# Patient Record
Sex: MALE | Race: WHITE | NOT HISPANIC OR LATINO | Employment: PART TIME | URBAN - METROPOLITAN AREA
[De-identification: names, ages, dates, MRNs, and addresses within clinical notes are randomized per-mention and may not be internally consistent; named-entity substitution may affect disease eponyms.]

---

## 2019-11-05 ENCOUNTER — OFFICE VISIT (OUTPATIENT)
Dept: NEUROLOGY | Facility: CLINIC | Age: 29
End: 2019-11-05
Payer: COMMERCIAL

## 2019-11-05 VITALS
HEIGHT: 73 IN | WEIGHT: 199.6 LBS | BODY MASS INDEX: 26.45 KG/M2 | HEART RATE: 69 BPM | SYSTOLIC BLOOD PRESSURE: 122 MMHG | DIASTOLIC BLOOD PRESSURE: 76 MMHG

## 2019-11-05 DIAGNOSIS — G40.909 NONINTRACTABLE EPILEPSY WITHOUT STATUS EPILEPTICUS, UNSPECIFIED EPILEPSY TYPE (HCC): Primary | ICD-10-CM

## 2019-11-05 PROCEDURE — 99205 OFFICE O/P NEW HI 60 MIN: CPT | Performed by: PSYCHIATRY & NEUROLOGY

## 2019-11-05 RX ORDER — LACOSAMIDE 200 MG/1
200 TABLET, FILM COATED ORAL 2 TIMES DAILY
Qty: 180 TABLET | Refills: 1 | Status: SHIPPED | OUTPATIENT
Start: 2019-11-05 | End: 2020-03-20 | Stop reason: SDUPTHER

## 2019-11-05 RX ORDER — LEVETIRACETAM 1000 MG/1
TABLET ORAL 2 TIMES DAILY
COMMUNITY
End: 2019-11-05 | Stop reason: SDUPTHER

## 2019-11-05 RX ORDER — LACOSAMIDE 200 MG/1
1 TABLET, FILM COATED ORAL 2 TIMES DAILY
Refills: 0 | COMMUNITY
Start: 2019-10-27 | End: 2019-11-05 | Stop reason: SDUPTHER

## 2019-11-05 RX ORDER — BIOTIN 5 MG
2 TABLET ORAL DAILY
COMMUNITY

## 2019-11-05 RX ORDER — LEVETIRACETAM 1000 MG/1
TABLET ORAL
Qty: 315 TABLET | Refills: 3 | Status: SHIPPED | OUTPATIENT
Start: 2019-11-05 | End: 2020-03-30 | Stop reason: SDUPTHER

## 2019-11-05 RX ORDER — MULTIVIT WITH MINERALS/LUTEIN
3000 TABLET ORAL DAILY
COMMUNITY

## 2019-11-05 NOTE — PATIENT INSTRUCTIONS
1  Continue levetiracetam 1500 mg AM / 2000 mg PM    2  Continue lacosamide 200 mg twice daily  3  Let us know if there are seizures  4  Return in about 6 months

## 2019-11-05 NOTE — PROGRESS NOTES
[place] [address] [facility]  Initial Consultation    Impression/Plan    Mr Idania Davenport is a 26 y o  male with mild developmental delay / static encephalopathy presenting regarding focal epilepsy manifest as focal aware/unaware seizures and secondary generalized tonic clonic seizures  His neurological exam is normal      We discussed the pathophysiology of epilepsy/seizure and seizure safety/precautions  We discussed factors that can lower seizure threshold and the side effects of antiepileptic medications  Seizures have been controlled on his current AED regimen  No changes today  Patient Instructions   1  Continue levetiracetam 1500 mg AM / 2000 mg PM    2  Continue lacosamide 200 mg twice daily  3  Let us know if there are seizures  4  Return in about 6 months  Diagnoses and all orders for this visit:    Nonintractable epilepsy without status epilepticus, unspecified epilepsy type ([facility] )  -     levETIRAcetam (KEPPRA) 1000 MG tablet; Take 1 5 tablets (1,500 mg total) by mouth every morning AND 2 tablets (2,000 mg total) daily at bedtime   -     VIMPAT 200 MG tablet; Take 1 tablet (200 mg total) by mouth 2 (two) times a day    Other orders  -     Ascorbic Acid (VITAMIN C) 1000 MG tablet; Take 3,000 mg by mouth daily  -     Cholecalciferol (VITAMIN D3 PO); Take 2,000 Units by mouth daily  -     Discontinue: VIMPAT 200 MG tablet; Take 1 tablet by mouth 2 (two) times a day  -     Discontinue: levETIRAcetam 1000 MG TB3D; Take by mouth TAKE 1 5 TABLETS IN THE AM AND 2 TABLETS IN THE PM  -     Krill Oil 1000 MG CAPS; Take 1 capsule by mouth 2 (two) times a day  -     Discontinue: levETIRAcetam (KEPPRA) 1000 MG tablet; Take by mouth 2 (two) times a day 1500 mg AM / 2000 mg PM        Neyda Marcano is a 26 y o  male presenting to the [place] [address] [facility] for evaluation of epilepsy  The first seizure was in 1/2005  Had seizure while sleeping on the B   Not started on AED  Next seizure was 6/2005 in [place]  Started phenytoin  No more seizures until a few years later when he had a seizure after the H1N1 flu shot  Eventually transitioned to levetiracetam because phenytoin levels were hard to manage  Seizures were then controlled for a number of years  Seizures recurred in 10/2018 (nocturnal)  Seizure prior had been about 5 years earlier  No change in levetiracetam 1500 mg AM / 2000 mg PM      Focal seizures occurred in 3/2019 over 4-5 days  Couldn't figure how to get out  of his seat to let someone pass  At restaurant he had trouble holding his burger for less than a minute  Then dropped ball when bowling and said right hand felt funny  There were 2 more at home, funny feeling in right hand and vacant look, with apparent retained awareness  Levetiracetam increased to 2000 mg twice daily  On the higher dose he seemed tired and not as focused (mixed up laundry), but these symptoms did seem to improve  Lower dose was discussed, but they did not have him decrease  6/9/2019  3 seizures over 1 5 days in setting of fever and didn't return back to baseline in between the first 2 seizures  Called EMS  [facility]  Had 3rd seizure in the ED  Vimpat added   Current AEDs:  Levetiracetam 1500 mg AM / 2000 mg PM  Vimpat 200 mg bid  Medication side effects: None  Medication adherence: Yes    Event/Seizure semiology:  1  GTC seizure  2  Focal seizure  R hand funny feeling, vacant look, apparent retained awareness  Special Features  Status epilepticus: no  Self Injury Seizures: unknown  Precipitating Factors: unknown    Epilepsy Risk Factors:  Full term, normal birth  Early intervention at about 18 months  Went to [facility]  Given diagnosis of mild/moderate MR  Walked and talked late  Talked at about 1 yo  2 years behind in school  Inclusion sometimes  Life skills program in middle and high school  Stopped school at 23  Works 3 days, 4 hours each   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  Obtained [employer]   Prior AEDs:  Phenytoin    Prior Evaluation:  Sedated MRI brain done in 5/2019  Study was normal    All EEGs have been normal      History Reviewed: The following were reviewed and updated as appropriate: allergies, current medications, past family history, past medical history, past social history, past surgical history and problem list     Psychiatric History:  None    Social History:   Driving: No  Lives Alone: unknown  Occupation: Works 3 days, 4 hours each  ROS:  No fever, chills, n/v  No vision changes  No chest pain, no sob  No respiratory problems  No walking problems  No new rash  Remainder of full 10 system review negative with exception of what is documented elsewhere in this note  Objective    /76 (BP Location: Left arm, Patient Position: Sitting, Cuff Size: Standard)   Pulse 69   Ht 6' 1" (1 854 m)   Wt 90 5 kg (199 lb 9 6 oz)   BMI 26 33 kg/m²      General Exam  General: well developed, no acute distress  HEENT: mucous membranes moist, anicteric sclera  Neck: supple, good ROM  Extremities: no clubbing, cyanosis or edema  Skin: no rash on visible skin  Neurological Exam  Mental Status: awake, alert, and fully oriented to person, place, time, and situation  Attention and memory intact  Fund of knowledge is appropriate for age and education  There is no neglect  Language: fluency, naming, comprehension, and repetition normal        Cranial Nerves: Pupils equal and reactive to light  Visual fields full to confrontation  Fundoscopic exam showed sharp discs and normal vasculature  Extraocular motions intact with full versions, normal pursuits and saccades  Facial strength full and symmetric  Facial sensation intact in V1-V3  Hearing intact to finger rub bilaterally  Tongue protrudes to midline  Palate elevates symmetrically  Speech clear without notable dysarthria   Shoulder shrug activation full and symmetric  Motor: Normal bulk and tone  No pronator drift  Strength is 5/5 proximally and distally in all 4 extremities  No involuntary movements  Sensory: Sensation intact to light touch distally in all extremities  Coordination: Normal finger-to-nose  Station and gait: Casual gait normal  Normal Romberg  Reflexes: Reflexes normal throughout and symmetric  Catherine Wright MD   [facility]  [facility] [facility]        Total time with patient today: 65 minutes  Greater than 50% of total time was spent with the patient and / or family counseling and / or coordination of care  A description of the counseling / coordination of care: discussed impression/plan in detail  See above

## 2020-03-20 ENCOUNTER — TELEPHONE (OUTPATIENT)
Dept: NEUROLOGY | Facility: CLINIC | Age: 30
End: 2020-03-20

## 2020-03-20 DIAGNOSIS — G40.909 NONINTRACTABLE EPILEPSY WITHOUT STATUS EPILEPTICUS, UNSPECIFIED EPILEPSY TYPE (HCC): ICD-10-CM

## 2020-03-20 RX ORDER — LACOSAMIDE 200 MG/1
TABLET, FILM COATED ORAL
Qty: 225 TABLET | Refills: 1 | Status: SHIPPED | OUTPATIENT
Start: 2020-03-20 | End: 2020-04-02 | Stop reason: SDUPTHER

## 2020-03-20 NOTE — TELEPHONE ENCOUNTER
Agree with evaluation at local ED  They should ask the ED to send serum levels of lacosamide and levetiracetam and do basic workup for cause of breakthrough seizures such as infection or electrolyte abnormality  Dose of lacosamide could be increased if there is no clear provocation identified  The ED physician can reach out to me if there are questions (clinical team can reach me via tiger text if ED calls)

## 2020-03-20 NOTE — TELEPHONE ENCOUNTER
pt's mom called and states that pt had a grand mal seizure this am   ambulance is there to take him to the hospital   she asked what she should do   she states that they live 70 miles from our office  i advised that he be evaluated in the nearest ER

## 2020-03-20 NOTE — TELEPHONE ENCOUNTER
Spoke with [facility] ED  Recommended increasing lacosamide to 200 mg AM / 300 mg PM  No provocation discovered  He is back to baseline now and will be discharged home  Lacosamide and levetiracetam levels were sent  New Rx sent to pharmacy on file

## 2020-03-20 NOTE — TELEPHONE ENCOUNTER
Mina GARNETT from [facility] ED called requesting to speak w/ Dr Brittni Taylor  Tiggary text message sent to Dr Brittni Taylor  Awaiting response  Call transferred to Dr Brittni Taylor                   [phone]

## 2020-03-30 DIAGNOSIS — G40.909 NONINTRACTABLE EPILEPSY WITHOUT STATUS EPILEPTICUS, UNSPECIFIED EPILEPSY TYPE (HCC): ICD-10-CM

## 2020-03-30 RX ORDER — LEVETIRACETAM 1000 MG/1
TABLET ORAL
Qty: 315 TABLET | Refills: 3 | Status: SHIPPED | OUTPATIENT
Start: 2020-03-30 | End: 2021-01-05 | Stop reason: SDUPTHER

## 2020-04-01 NOTE — TELEPHONE ENCOUNTER
Spoke with Val Brunner at [facility] and she is stating that no where in the patients chart does it state that you wanted these levels ordered so due to this we are unable to obtain patients blood levels without patients signed authorization  I explained to her the prior phone conversation but again due to nothing being documented on their end they cannot release the levels to us  A Dr Earle Austin ordered the levels

## 2020-04-01 NOTE — TELEPHONE ENCOUNTER
We need the results  Should fall under continuity of care exception, but if they are hindering care then consider having patient give [facility] verbal consent so that results can be sent to us

## 2020-04-02 DIAGNOSIS — G40.909 NONINTRACTABLE EPILEPSY WITHOUT STATUS EPILEPTICUS, UNSPECIFIED EPILEPSY TYPE (HCC): ICD-10-CM

## 2020-04-02 RX ORDER — LACOSAMIDE 200 MG/1
TABLET, FILM COATED ORAL
Qty: 225 TABLET | Refills: 1 | Status: SHIPPED | OUTPATIENT
Start: 2020-04-02 | End: 2020-04-15 | Stop reason: SDUPTHER

## 2020-04-03 NOTE — TELEPHONE ENCOUNTER
[address] records, a release of information form was e-mailed to patient yesterday  Will check back next week

## 2020-04-13 NOTE — TELEPHONE ENCOUNTER
LM with patients mother Rylie Ugarte asking if form was mailed back to [facility] for records to be released

## 2020-04-15 RX ORDER — LACOSAMIDE 100 MG/1
100 TABLET, FILM COATED ORAL
Qty: 90 TABLET | Refills: 1 | Status: SHIPPED | OUTPATIENT
Start: 2020-04-15 | End: 2020-04-15 | Stop reason: ALTCHOICE

## 2020-04-15 RX ORDER — LACOSAMIDE 200 MG/1
200 TABLET, FILM COATED ORAL EVERY 12 HOURS SCHEDULED
Qty: 180 TABLET | Refills: 1 | Status: SHIPPED | OUTPATIENT
Start: 2020-04-15 | End: 2020-04-15 | Stop reason: SDUPTHER

## 2020-04-15 RX ORDER — LACOSAMIDE 200 MG/1
TABLET, FILM COATED ORAL
Qty: 225 TABLET | Refills: 3 | Status: SHIPPED | OUTPATIENT
Start: 2020-04-15 | End: 2020-05-04 | Stop reason: SDUPTHER

## 2020-04-15 NOTE — TELEPHONE ENCOUNTER
Spoke with Yesenia Sarabia  She was never made aware of this and made me aware that of the release for Fartun Catarina did sign the form as well as print his name  She is going to be calling Mauro to follow up with them

## 2020-04-15 NOTE — TELEPHONE ENCOUNTER
Spoke with Taran Flores at [facility]  She notified me that the patients mother Honora Frankel) signed the form which needs to be signed by the patient so it was sent back to her  Will reach back out to [facility] to make sure she is aware of this

## 2020-04-15 NOTE — TELEPHONE ENCOUNTER
Spoke with patients mother, she stated she sent the form back on 4 2  She did receive my call 2 days ago and called Ed Downey explaining that we have yet to receive anything  Will call Ed Downey now

## 2020-05-04 ENCOUNTER — TELEPHONE (OUTPATIENT)
Dept: NEUROLOGY | Facility: CLINIC | Age: 30
End: 2020-05-04

## 2020-05-04 DIAGNOSIS — G40.909 NONINTRACTABLE EPILEPSY WITHOUT STATUS EPILEPTICUS, UNSPECIFIED EPILEPSY TYPE (HCC): Primary | ICD-10-CM

## 2020-05-04 RX ORDER — LACOSAMIDE 150 MG/1
300 TABLET ORAL EVERY 12 HOURS SCHEDULED
Qty: 120 TABLET | Refills: 5 | Status: SHIPPED | OUTPATIENT
Start: 2020-05-04 | End: 2020-05-26 | Stop reason: SDUPTHER

## 2020-05-21 ENCOUNTER — HOSPITAL ENCOUNTER (OUTPATIENT)
Dept: NEUROLOGY | Facility: HOSPITAL | Age: 30
Discharge: HOME | End: 2020-05-21
Attending: PSYCHIATRY & NEUROLOGY
Payer: COMMERCIAL

## 2020-05-21 DIAGNOSIS — G40.909 NONINTRACTABLE EPILEPSY WITHOUT STATUS EPILEPTICUS, UNSPECIFIED EPILEPSY TYPE (HCC): ICD-10-CM

## 2020-05-21 PROCEDURE — 95812 EEG 41-60 MINUTES: CPT | Performed by: PSYCHIATRY & NEUROLOGY

## 2020-05-21 PROCEDURE — 95819 EEG AWAKE AND ASLEEP: CPT

## 2020-05-26 ENCOUNTER — TELEMEDICINE (OUTPATIENT)
Dept: NEUROLOGY | Facility: CLINIC | Age: 30
End: 2020-05-26
Payer: COMMERCIAL

## 2020-05-26 ENCOUNTER — TELEPHONE (OUTPATIENT)
Dept: NEUROLOGY | Facility: CLINIC | Age: 30
End: 2020-05-26

## 2020-05-26 VITALS
SYSTOLIC BLOOD PRESSURE: 118 MMHG | WEIGHT: 195 LBS | BODY MASS INDEX: 25.84 KG/M2 | DIASTOLIC BLOOD PRESSURE: 75 MMHG | HEIGHT: 73 IN | HEART RATE: 67 BPM

## 2020-05-26 DIAGNOSIS — G40.909 NONINTRACTABLE EPILEPSY WITHOUT STATUS EPILEPTICUS, UNSPECIFIED EPILEPSY TYPE (HCC): Primary | ICD-10-CM

## 2020-05-26 PROCEDURE — 99213 OFFICE O/P EST LOW 20 MIN: CPT | Performed by: NURSE PRACTITIONER

## 2020-05-26 RX ORDER — LACOSAMIDE 150 MG/1
300 TABLET ORAL EVERY 12 HOURS SCHEDULED
Qty: 120 TABLET | Refills: 2 | Status: SHIPPED | OUTPATIENT
Start: 2020-05-26 | End: 2020-09-08

## 2020-06-29 ENCOUNTER — HOSPITAL ENCOUNTER (OUTPATIENT)
Dept: NEUROLOGY | Facility: CLINIC | Age: 30
Discharge: HOME | End: 2020-06-29

## 2020-06-29 DIAGNOSIS — G40.909 NONINTRACTABLE EPILEPSY WITHOUT STATUS EPILEPTICUS, UNSPECIFIED EPILEPSY TYPE (HCC): ICD-10-CM

## 2020-06-30 ENCOUNTER — HOSPITAL ENCOUNTER (OUTPATIENT)
Dept: NEUROLOGY | Facility: CLINIC | Age: 30
Discharge: HOME | End: 2020-06-30
Payer: COMMERCIAL

## 2020-06-30 DIAGNOSIS — G40.909 NONINTRACTABLE EPILEPSY WITHOUT STATUS EPILEPTICUS, UNSPECIFIED EPILEPSY TYPE (HCC): ICD-10-CM

## 2020-06-30 PROCEDURE — 95708 EEG WO VID EA 12-26HR UNMNTR: CPT

## 2020-06-30 PROCEDURE — 95719 EEG PHYS/QHP EA INCR W/O VID: CPT | Performed by: PSYCHIATRY & NEUROLOGY

## 2020-07-07 ENCOUNTER — TELEPHONE (OUTPATIENT)
Dept: NEUROLOGY | Facility: CLINIC | Age: 30
End: 2020-07-07

## 2020-07-07 NOTE — TELEPHONE ENCOUNTER
----- [facility] at 7/6/2020  8:28 AM EDT -----  Patient's EEG is similar to the prior  No changes in patient's medications at this time

## 2020-07-07 NOTE — TELEPHONE ENCOUNTER
Spoke to pt mom and reviewed results  She does report that he had a focal seizure that occurred while he was eating on sunday  He was upset talking about friends during dinner then stared off for approximately 5 seconds (had a look of fear) and then was confused/out of it for another 5-10 seconds mom reports  Mom said she knew we would be calling with results, so that is why she did not call us to report the seizure  She states she would prefer not to increase his medication if you are ok with him having a focal sz "here and there"  Pt mom had other questions in regards to reading the eeg and if there was any particular time of day that she should be more concerned about monitoring him for sz activity  She asked if someone could call her back to discuss the EEG in more detail

## 2020-07-09 NOTE — TELEPHONE ENCOUNTER
The EEG shows that there is abnormal activity coming from the right side of his brain which is likely where his seizures are coming from  He did not have any seizures recorded on his EEG  These findings are the same as his last EEG done in May which confirms his need to be on medication to control his seizures  His seizures seemed to be controlled at his last visit after his vimpat was increased  If he begins to have the focal seizures more frequently she should let us know  She should also let us know if he has a bigger seizure (he does have generalized tonic clonic seizures as well) because we would then definitely want to increase his medications       Thank you

## 2020-09-08 DIAGNOSIS — G40.909 NONINTRACTABLE EPILEPSY WITHOUT STATUS EPILEPTICUS, UNSPECIFIED EPILEPSY TYPE (HCC): ICD-10-CM

## 2020-09-08 LAB — HBA1C MFR BLD HPLC: 5.3 %

## 2020-09-08 RX ORDER — LACOSAMIDE 150 MG/1
TABLET, FILM COATED ORAL
Qty: 120 TABLET | Refills: 2 | Status: SHIPPED | OUTPATIENT
Start: 2020-09-08 | End: 2020-12-09 | Stop reason: SDUPTHER

## 2020-10-02 ENCOUNTER — TELEPHONE (OUTPATIENT)
Dept: NEUROLOGY | Facility: CLINIC | Age: 30
End: 2020-10-02

## 2020-10-05 ENCOUNTER — TELEMEDICINE (OUTPATIENT)
Dept: NEUROLOGY | Facility: CLINIC | Age: 30
End: 2020-10-05
Payer: COMMERCIAL

## 2020-10-05 VITALS — WEIGHT: 202 LBS | HEIGHT: 73 IN | BODY MASS INDEX: 26.77 KG/M2

## 2020-10-05 DIAGNOSIS — G40.909 NONINTRACTABLE EPILEPSY WITHOUT STATUS EPILEPTICUS, UNSPECIFIED EPILEPSY TYPE (HCC): Primary | ICD-10-CM

## 2020-10-05 DIAGNOSIS — F70 MILD INTELLECTUAL DISABILITIES: ICD-10-CM

## 2020-10-05 PROCEDURE — 99214 OFFICE O/P EST MOD 30 MIN: CPT | Performed by: PSYCHIATRY & NEUROLOGY

## 2020-10-07 PROBLEM — F70 MILD INTELLECTUAL DISABILITIES: Status: ACTIVE | Noted: 2020-10-07

## 2020-10-08 ENCOUNTER — TELEPHONE (OUTPATIENT)
Dept: NEUROLOGY | Facility: CLINIC | Age: 30
End: 2020-10-08

## 2020-10-08 DIAGNOSIS — G40.909 NONINTRACTABLE EPILEPSY WITHOUT STATUS EPILEPTICUS, UNSPECIFIED EPILEPSY TYPE (HCC): Primary | ICD-10-CM

## 2020-12-09 DIAGNOSIS — G40.909 NONINTRACTABLE EPILEPSY WITHOUT STATUS EPILEPTICUS, UNSPECIFIED EPILEPSY TYPE (HCC): ICD-10-CM

## 2020-12-10 RX ORDER — LACOSAMIDE 150 MG/1
TABLET ORAL
Qty: 120 TABLET | Refills: 2 | Status: SHIPPED | OUTPATIENT
Start: 2020-12-10 | End: 2021-01-05 | Stop reason: SDUPTHER

## 2021-01-05 ENCOUNTER — TELEPHONE (OUTPATIENT)
Dept: NEUROLOGY | Facility: CLINIC | Age: 31
End: 2021-01-05

## 2021-01-05 ENCOUNTER — TELEMEDICINE (OUTPATIENT)
Dept: NEUROLOGY | Facility: CLINIC | Age: 31
End: 2021-01-05
Payer: COMMERCIAL

## 2021-01-05 DIAGNOSIS — F70 MILD INTELLECTUAL DISABILITIES: ICD-10-CM

## 2021-01-05 DIAGNOSIS — G40.909 NONINTRACTABLE EPILEPSY WITHOUT STATUS EPILEPTICUS, UNSPECIFIED EPILEPSY TYPE (HCC): Primary | ICD-10-CM

## 2021-01-05 PROCEDURE — 99214 OFFICE O/P EST MOD 30 MIN: CPT | Performed by: PSYCHIATRY & NEUROLOGY

## 2021-01-05 RX ORDER — LACOSAMIDE 150 MG/1
TABLET ORAL
Qty: 120 TABLET | Refills: 5 | Status: SHIPPED | OUTPATIENT
Start: 2021-01-05 | End: 2021-05-05 | Stop reason: SDUPTHER

## 2021-01-05 RX ORDER — LEVETIRACETAM 1000 MG/1
TABLET ORAL
Qty: 315 TABLET | Refills: 3 | Status: SHIPPED | OUTPATIENT
Start: 2021-01-05 | End: 2021-10-20 | Stop reason: SDUPTHER

## 2021-01-05 NOTE — PROGRESS NOTES
[place] [address] [address] [address] [place] [address]  Follow Up Visit    Impression/Plan    Mr Tobi Leach is a 31 y o  male with mild developmental delay / static encephalopathy presenting regarding focal epilepsy manifest as focal aware/unaware seizures and secondary generalized tonic clonic seizures  His neurological exam is normal      His events remain uncontrolled despite high doses of 2 AEDs  Stress seems to trigger nearly all of his recent events  His recent AEEG was not conclusive and revealed rhythmic activities of uncertain significance  Additional data is required in the form of EMU admission to characterize events on VEEG in order to guide the appropriate therapy (antiepileptic medication versus treatment of nonepileptic events)  There may be logistical problems with doing the admission as currently scheduled on January 25th  His mother is caring for his father who recently had a severe COVID infection  She needs to coordinate his care and get him to dialysis  In the next week she will determine if January admission is practical or if we need to reschedule for few months out  They will give us call if seizures worsen or if there are other questions/concerns  Patient Instructions   1  Continue current seizure medications unchanged  2  Let us know if there are seizures or problems with your medication  3  Consider rescheduling EMU admission if necessary  4  Return in 4 months to see Candace Cordero  Diagnoses and all orders for this visit:    Nonintractable epilepsy without status epilepticus, unspecified epilepsy type ([address] )    Mild intellectual disabilities        Subjective    Ochoa Knowles is returning to the [place] [address] [facility] for follow up  The patient and his mother were present for this video visit  Interval Events:   Seizures since last visit: yes, unchanged  Hospitalizations: no    A few events per month  Most recent was 1/2/2020   Unusually the most recent event occurred without him being agitated  Didn't look scared prior  Mother walked in and found him staring  He would count with mom at times, but then stop and stare  When then counted to 100 he would go in and out  There was rocking back and forth in his chair and apparent valsalva (like he was trying to go to the bathroom  When they got to 110 he seemed to be back at his baseline  Tested positive for COVID in early December  His father ended up with severe COVID symptoms and was hospitalized for multiple weeks and is now on dialysis  Current AEDs:  Levetiracetam 1500 mg AM / 2000 mg PM  Lacosamide 300 mg bid  Medication side effects: None  Medication adherence: Yes     Event/Seizure semiology:  · GTC seizure  · Focal seizure  R hand funny feeling, vacant look, apparent retained awareness       Special Features  Status epilepticus: no  Self Injury Seizures: unknown  Precipitating Factors: unknown     Epilepsy Risk Factors:  Full term, normal birth  Early intervention at about 18 months  Went to [facility]  Given diagnosis of mild/moderate MR  Walked and talked late  Talked at about 1 yo  2 years behind in school  Inclusion sometimes  Life skills program in middle and high school  Stopped school at 23  Works 3 days, 4 hours each  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  Obtained [employer]       Prior AEDs:  Phenytoin     Prior Evaluation:  Sedated MRI brain done in 5/2019  Study was normal    All EEGs have been normal       05/21/2020 EEG awake and asleep (Dr Darby Astudillo): Intermittent brief bursts of diffuse, right hemisphere dominant, 3-4 cps slowing as well as similar slowing isolated to the right hemisphere and maximal over the temporal region suggest underlying neuronal dysfunction with possible focal neuronal dysfunction in the right temporal region   Some of the bursts contain poorly formed, poorly localized, sharp components that are of uncertain significance       24 hour AEEG 6/30/2020:  Sporadic right temporal delta activities suggests an underlying area of neuronal dysfunction  The frequent runs of diffuse rhythmic theta activities that were maximal in the right temporal region are of unclear significance  These were typically brief and without clear evolution  Some features (diffuse distribution, abrupt start/stop, and on one occasion, increase in frequency over course of burst) would be suggestive of SREDA, however, patients age and short duration of this activity would be atypical for this variant  Overall, these are favored to be a benign variant, but the possibility of brief ictal rhythmic discharges cannot fully be excluded  A run of bilateral frontal delta activities is favored to represent artifact          History Reviewed: The following were reviewed and updated as appropriate: allergies, current medications, past family history, past medical history, past social history, past surgical history and problem list     Psychiatric History:  None     Social History:   Driving: No  Lives Alone: unknown  Occupation: Works 3 days, 4 hours each      ROS:  Review of Systems   Constitutional: Negative  Negative for appetite change and fever  HENT: Negative  Negative for hearing loss, tinnitus, trouble swallowing and voice change  Eyes: Negative  Negative for photophobia and pain  Respiratory: Negative  Negative for shortness of breath  Cardiovascular: Negative  Negative for palpitations  Gastrointestinal: Negative  Negative for nausea and vomiting  Endocrine: Negative  Negative for cold intolerance  Genitourinary: Negative  Negative for dysuria, frequency and urgency  Musculoskeletal: Negative  Negative for myalgias and neck pain  Skin: Negative  Negative for rash  Neurological: Positive for seizures  Negative for dizziness, tremors, syncope, facial asymmetry, speech difficulty, weakness, light-headedness, numbness and headaches     Hematological: Negative  Does not bruise/bleed easily  Psychiatric/Behavioral: Negative  Negative for confusion, hallucinations and sleep disturbance  ROS reviewed and updated as appropriate  Objective    There were no vitals taken for this visit  General Exam  No acute distress  Neurologic Exam  Mental Status:  Alert and oriented  Language: normal fluency and comprehension  Cranial Nerves: Face symmetric  No dysarthria

## 2021-01-05 NOTE — PROGRESS NOTES
Virtual Regular Visit      Assessment/Plan:    Problem List Items Addressed This Visit     None               Reason for visit is   Chief Complaint   Patient presents with    Virtual Regular Visit        Encounter provider Hunter Newell MD    Provider located at [facility] [id number]      Recent Visits  No visits were found meeting these conditions  Showing recent visits within past 7 days and meeting all other requirements     Today's Visits  Date Type Provider Dept   01/05/21 Telephone [address] [facility] today's visits and meeting all other requirements     Future Appointments  No visits were found meeting these conditions  Showing future appointments within next 150 days and meeting all other requirements        The patient was identified by name and date of birth  River Pitts was informed that this is a telemedicine visit and that the visit is being conducted through [employer] and patient was informed that this is a secure, HIPAA-compliant platform  He agrees to proceed     My office door was closed  No one else was in the room  He acknowledged consent and understanding of privacy and security of the video platform  The patient has agreed to participate and understands they can discontinue the visit at any time  Patient is aware this is a billable service  [address] Neurology [address] [facility]  Follow Up Visit    Impression/Plan    Mr Janeth Estrada is a 31 y o  male with mild developmental delay / static encephalopathy presenting regarding focal epilepsy manifest as focal aware/unaware seizures and secondary generalized tonic clonic seizures  His neurological exam is normal      His events remain uncontrolled despite high doses of 2 AEDs  Stress seems to trigger nearly all of his recent events   His recent AEEG was not conclusive and revealed rhythmic activities of uncertain significance  Additional data is required in the form of EMU admission to characterize events on VEEG in order to guide the appropriate therapy (antiepileptic medication versus treatment of nonepileptic events)  There may be logistical problems with doing the admission as currently scheduled on January 25th  His mother is caring for his father who recently had a severe COVID infection  She needs to coordinate his care and get him to dialysis  In the next week she will determine if January admission is practical or if we need to reschedule for few months out  They will give us call if seizures worsen or if there are other questions/concerns  Patient Instructions   1  Continue current seizure medications unchanged  2  Let us know if there are seizures or problems with your medication  3  Consider rescheduling EMU admission if necessary  4  Return in 4 months to see Rene Strong  Diagnoses and all orders for this visit:    Nonintractable epilepsy without status epilepticus, unspecified epilepsy type ([facility] )    Mild intellectual disabilities        Subjective    Oneal Rausch is returning to the [place] [address] [facility] for follow up  The patient and his mother were present for this video visit  Interval Events:   Seizures since last visit: yes, unchanged  Hospitalizations: no    A few events per month  Most recent was 1/2/2020  Unusually the most recent event occurred without him being agitated  Didn't look scared prior  Mother walked in and found him staring  He would count with mom at times, but then stop and stare  When then counted to 100 he would go in and out  There was rocking back and forth in his chair and apparent valsalva (like he was trying to go to the bathroom  When they got to 110 he seemed to be back at his baseline  Tested positive for COVID in early December    His father ended up with severe COVID symptoms and was hospitalized for multiple weeks and is now on dialysis  Current AEDs:  Levetiracetam 1500 mg AM / 2000 mg PM  Lacosamide 300 mg bid  Medication side effects: None  Medication adherence: Yes     Event/Seizure semiology:  · GTC seizure  · Focal seizure  R hand funny feeling, vacant look, apparent retained awareness       Special Features  Status epilepticus: no  Self Injury Seizures: unknown  Precipitating Factors: unknown     Epilepsy Risk Factors:  Full term, normal birth  Early intervention at about 18 months  Went to [facility]  Given diagnosis of mild/moderate MR  Walked and talked late  Talked at about 1 yo  2 years behind in school  Inclusion sometimes  Life skills program in middle and high school  Stopped school at 23  Works 3 days, 4 hours each  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  Obtained [employer]        Prior AEDs:  Phenytoin     Prior Evaluation:  Sedated MRI brain done in 5/2019  Study was normal    All EEGs have been normal       05/21/2020 EEG awake and asleep (Dr Sumit Sena): Intermittent brief bursts of diffuse, right hemisphere dominant, 3-4 cps slowing as well as similar slowing isolated to the right hemisphere and maximal over the temporal region suggest underlying neuronal dysfunction with possible focal neuronal dysfunction in the right temporal region  Some of the bursts contain poorly formed, poorly localized, sharp components that are of uncertain significance       24 hour AEEG 6/30/2020:  Sporadic right temporal delta activities suggests an underlying area of neuronal dysfunction  The frequent runs of diffuse rhythmic theta activities that were maximal in the right temporal region are of unclear significance  These were typically brief and without clear evolution   Some features (diffuse distribution, abrupt start/stop, and on one occasion, increase in frequency over course of burst) would be suggestive of SREDA, however, patients age and short duration of this activity would be atypical for this variant  Overall, these are favored to be a benign variant, but the possibility of brief ictal rhythmic discharges cannot fully be excluded  A run of bilateral frontal delta activities is favored to represent artifact          History Reviewed: The following were reviewed and updated as appropriate: allergies, current medications, past family history, past medical history, past social history, past surgical history and problem list     Psychiatric History:  None     Social History:   Driving: No  Lives Alone: unknown  Occupation: Works 3 days, 4 hours each      ROS:  Review of Systems   Constitutional: Negative  Negative for appetite change and fever  HENT: Negative  Negative for hearing loss, tinnitus, trouble swallowing and voice change  Eyes: Negative  Negative for photophobia and pain  Respiratory: Negative  Negative for shortness of breath  Cardiovascular: Negative  Negative for palpitations  Gastrointestinal: Negative  Negative for nausea and vomiting  Endocrine: Negative  Negative for cold intolerance  Genitourinary: Negative  Negative for dysuria, frequency and urgency  Musculoskeletal: Negative  Negative for myalgias and neck pain  Skin: Negative  Negative for rash  Neurological: Positive for seizures  Negative for dizziness, tremors, syncope, facial asymmetry, speech difficulty, weakness, light-headedness, numbness and headaches  Hematological: Negative  Does not bruise/bleed easily  Psychiatric/Behavioral: Negative  Negative for confusion, hallucinations and sleep disturbance  ROS reviewed and updated as appropriate  Objective    There were no vitals taken for this visit  General Exam  No acute distress  Neurologic Exam  Mental Status:  Alert and oriented  Language: normal fluency and comprehension  Cranial Nerves: Face symmetric  No dysarthria              I spent 15 minutes directly with the patient during this visit      VIRTUAL VISIT DISCLAIMER    Rochelle Duke acknowledges that he has consented to an online visit or consultation  He understands that the online visit is based solely on information provided by him, and that, in the absence of a face-to-face physical evaluation by the physician, the diagnosis he receives is both limited and provisional in terms of accuracy and completeness  This is not intended to replace a full medical face-to-face evaluation by the physician  Rochelle Duke understands and accepts these terms

## 2021-01-05 NOTE — PATIENT INSTRUCTIONS
1  Continue current seizure medications unchanged  2  Let us know if there are seizures or problems with your medication  3  Consider rescheduling EMU admission if necessary  4  Return in 4 months to see Dennis Ferrera

## 2021-01-11 ENCOUNTER — TELEPHONE (OUTPATIENT)
Dept: NEUROLOGY | Facility: CLINIC | Age: 31
End: 2021-01-11

## 2021-01-11 NOTE — TELEPHONE ENCOUNTER
Received fax from [facility] for patient    Patient needs PA for vimpat    PA initiated on CMM    KEY [id number]    Awaiting determination

## 2021-01-13 ENCOUNTER — TELEPHONE (OUTPATIENT)
Dept: NEUROLOGY | Facility: CLINIC | Age: 31
End: 2021-01-13

## 2021-01-13 DIAGNOSIS — G40.909 NONINTRACTABLE EPILEPSY WITHOUT STATUS EPILEPTICUS, UNSPECIFIED EPILEPSY TYPE (HCC): Primary | ICD-10-CM

## 2021-01-13 NOTE — TELEPHONE ENCOUNTER
pt's mom called to cancel EMU for 1/25  dr Wade Moreno was aware of the potential need to reschedule EMU at recent appt  She would like to schedule when dr Wade Moreno is in the EMU  I spoke to Jo Meng as EMU coverage was changed  She states that Dr Wade Moreno is in EMU 3/1  and 3/15  Pt's mom made aware  she would like to rescedule for 3/15 at 8am  EMU scheduled  Added to calendar  ADT order entered  1/25 EMU admission deleted from EMU calendar  PAC's made aware of cancellation    Admission updated by [facility]

## 2021-03-01 NOTE — TELEPHONE ENCOUNTER
Patient's mother stated she cannot come with patient to the EMU admit, and patient's father cannot either  She requested the EMU admit be cancelled for now and she will call back  Removed admit from the calendar  PACS made aware

## 2021-03-30 DIAGNOSIS — Z23 ENCOUNTER FOR IMMUNIZATION: ICD-10-CM

## 2021-04-29 ENCOUNTER — TELEPHONE (OUTPATIENT)
Dept: NEUROLOGY | Facility: CLINIC | Age: 31
End: 2021-04-29

## 2021-04-29 NOTE — TELEPHONE ENCOUNTER
Patient's mother calling to see if you would be agreeable to switching his upcoming appointment to a virtual  Said that she just had heart surgery and is starting cardiac rehab and will not be able to drive the patient  Please advise    [phone]: Ok to leave a detailed message

## 2021-05-03 ENCOUNTER — TELEPHONE (OUTPATIENT)
Dept: NEUROLOGY | Facility: CLINIC | Age: 31
End: 2021-05-03

## 2021-05-03 NOTE — TELEPHONE ENCOUNTER
Per Dr Robin Jones ok to change to [facility] virtual video visit  Obtained verbal consent for financial policy and insurance authorization form for virtual video visit  Confirmed registration info

## 2021-05-05 ENCOUNTER — TELEMEDICINE (OUTPATIENT)
Dept: NEUROLOGY | Facility: CLINIC | Age: 31
End: 2021-05-05
Payer: COMMERCIAL

## 2021-05-05 DIAGNOSIS — G40.009 PARTIAL IDIOPATHIC EPILEPSY WITH SEIZURES OF LOCALIZED ONSET, NOT INTRACTABLE, WITHOUT STATUS EPILEPTICUS (HCC): Primary | ICD-10-CM

## 2021-05-05 DIAGNOSIS — F70 MILD INTELLECTUAL DISABILITIES: ICD-10-CM

## 2021-05-05 PROCEDURE — 99214 OFFICE O/P EST MOD 30 MIN: CPT | Performed by: PSYCHIATRY & NEUROLOGY

## 2021-05-05 RX ORDER — LACOSAMIDE 150 MG/1
TABLET ORAL
Qty: 120 TABLET | Refills: 5 | Status: SHIPPED | OUTPATIENT
Start: 2021-05-05 | End: 2021-10-20 | Stop reason: SDUPTHER

## 2021-05-05 NOTE — PROGRESS NOTES
Virtual Regular Visit      Assessment/Plan:    Problem List Items Addressed This Visit        Nervous and Auditory    Partial idiopathic epilepsy with seizures of localized onset, not intractable, without status epilepticus ([address] ) - Primary    Relevant Medications    lacosamide (Vimpat) 150 mg tablet       Other    Mild intellectual disabilities               Reason for visit is   Chief Complaint   Patient presents with    Virtual Regular Visit        Encounter provider Luis Jackson MD    Provider located at [address]  [address] [address]      Recent Visits  Date Type Provider Dept   [facility]   05/03/21 Telephone [address] [facility] recent visits within past 7 days and meeting all other requirements     Future Appointments  No visits were found meeting these conditions  Showing future appointments within next 150 days and meeting all other requirements        The patient was identified by name and date of birth  Shantell Florez was informed that this is a telemedicine visit and that the visit is being conducted through [address] [place] [address] [employer] and patient was informed that this is a secure, HIPAA-compliant platform  He agrees to proceed     My office door was closed  No one else was in the room  He acknowledged consent and understanding of privacy and security of the video platform  The patient has agreed to participate and understands they can discontinue the visit at any time  Patient is aware this is a billable service       [address] Neurology [address] [facility]  Follow Up Visit    Impression/Plan    Mr Olamide Patiño is a 31 y o  male with mild developmental delay / static encephalopathy presenting regarding focal epilepsy manifest as focal aware/impaired aware seizures and secondary generalized tonic clonic seizures  His neurological exam is normal      His events remain uncontrolled despite high doses of 2 AEDs  Stress seems to trigger many of his recent events, but not all  His 2020 AEEG was not conclusive and revealed rhythmic activities of uncertain significance  Additional data is required in the form of EMU admission to characterize events on VEEG in order to guide the appropriate therapy (antiepileptic medication versus treatment of nonepileptic events)  They will give us call if seizures worsen or if there are other questions/concerns  Patient Instructions   1  Schedule EMU admission  2  Return in about 3 months  Diagnoses and all orders for this visit:    Partial idiopathic epilepsy with seizures of localized onset, not intractable, without status epilepticus ([facility] )  -     lacosamide (Vimpat) 150 mg tablet; Take two tablets (300 mg) by mouth every 12 hours    Mild intellectual disabilities        Subjective    Edy Kelly is returning to the [place] [address] [facility] for follow up  Interval Events:   Seizures since last visit: events essentially unchanged, occur every few weeks  No convulsion  Hospitalizations: no    Still at home, not back to work program  Mother had cardiac bypass surgery about 2 months ago  Father recovering from illness and requires HD for ESRD  Continues to have 15-20 second events  Can be every few weeks, sometimes might occur for a few days in a row  Last event was about 2 weeks ago  Usually sighs at the beginning and may stand up and then look of fear  They start to count and then he joins the counting between 10 and 20  Doesn't know that they happen  Says "I'm fine, I'm fine" after the events  After the events his hands are cold  Not a consistent trigger, but does seem to be when he gets frustrated or obsessed with something  [place] like Cynthiaparmjit Dalton caused an unpleasant feeling and he stopped, but no seizure occurred       Current AEDs:  Lacosamide 300 mg bid   Levetiracetam 1500 mg AM / 2000 mg PM  Medication side effects: None  Medication adherence: Yes    Event/Seizure semiology:  · GTC seizure  · Focal seizure  R hand funny feeling, vacant look, apparent retained awareness, but does not fully interact for a time  Usually sighs at the beginning and may stand up and then look of fear  They start to count and then he joins the counting between 10 and 20  Doesn't know that they happen  Says "I'm fine, I'm fine" after the events  After the events his hands are cold      Special Features  Status epilepticus: no  Self Injury Seizures: unknown  Precipitating Factors: unknown     Epilepsy Risk Factors:  Full term, normal birth  Early intervention at about 18 months  Went to [facility]  Given diagnosis of mild/moderate MR  Walked and talked late  Talked at about 1 yo  2 years behind in school  Inclusion sometimes  Life skills program in middle and high school  Stopped school at 23  Works 3 days, 4 hours each  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  Obtained [employer]        Prior AEDs:  Phenytoin     Prior Evaluation:  Sedated MRI brain done in 5/2019  Study was normal    All EEGs have been normal       05/21/2020 EEG awake and asleep (Dr Lilly Page): Intermittent brief bursts of diffuse, right hemisphere dominant, 3-4 cps slowing as well as similar slowing isolated to the right hemisphere and maximal over the temporal region suggest underlying neuronal dysfunction with possible focal neuronal dysfunction in the right temporal region  Some of the bursts contain poorly formed, poorly localized, sharp components that are of uncertain significance       24 hour AEEG 6/30/2020:  Sporadic right temporal delta activities suggests an underlying area of neuronal dysfunction  The frequent runs of diffuse rhythmic theta activities that were maximal in the right temporal region are of unclear significance  These were typically brief and without clear evolution   Some features (diffuse distribution, abrupt start/stop, and on one occasion, increase in frequency over course of burst) would be suggestive of SREDA, however, patients age and short duration of this activity would be atypical for this variant  Overall, these are favored to be a benign variant, but the possibility of brief ictal rhythmic discharges cannot fully be excluded  A run of bilateral frontal delta activities is favored to represent artifact          History Reviewed: The following were reviewed and updated as appropriate: allergies, current medications, past medical history, past social history, and problem list     Psychiatric History:  None     Social History:   Driving: No  Lives Alone:no  Occupation: Works 3 days, 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)    Objective    There were no vitals taken for this visit  General Exam  No acute distress  Neurologic Exam  Mental Status:  Alert and oriented x 3  Language: normal fluency and comprehension  Cranial Nerves: Face symmetric  No dysarthria  Motor:  Moves arms symmetrically without evidence of weakness or ataxia  I spent 15 minutes directly with the patient during this visit      VIRTUAL VISIT DISCLAIMER    Cristofer Tiwari acknowledges that he has consented to an online visit or consultation  He understands that the online visit is based solely on information provided by him, and that, in the absence of a face-to-face physical evaluation by the physician, the diagnosis he receives is both limited and provisional in terms of accuracy and completeness  This is not intended to replace a full medical face-to-face evaluation by the physician  Cristofer Tiwari understands and accepts these terms

## 2021-05-07 ENCOUNTER — TELEPHONE (OUTPATIENT)
Dept: NEUROLOGY | Facility: CLINIC | Age: 31
End: 2021-05-07

## 2021-05-07 DIAGNOSIS — G40.009 PARTIAL IDIOPATHIC EPILEPSY WITH SEIZURES OF LOCALIZED ONSET, NOT INTRACTABLE, WITHOUT STATUS EPILEPTICUS (HCC): Primary | ICD-10-CM

## 2021-05-07 NOTE — TELEPHONE ENCOUNTER
----- Message from Barbara Carpenter MD sent at 5/7/2021  7:31 AM EDT -----  Regarding: EMU admission  Please schedule EMU admission to characterize events that may or may not be seizure and determine definite diagnosis in the setting of indefinite findings on prior EEG in a patient with intractable epilepsy  Please see if we can get admission approved without requiring another routine EEG which will serve no purpose - the EMU admission will be required independent of any interictal EEG finding and his events are too infrequent to capture on a routine EEG       Last EEGs:   REEG 5/21/2020AEEG 6/30/2020

## 2021-05-07 NOTE — TELEPHONE ENCOUNTER
Mom returned call  Agreeable to rescheduling EMU admission  Added to EMU calendar for 6/14/2021 [facility] entered  EMU information mailed to patient  Thomas aCceres, EMU admission for 6/14/2021 9am     Dr Ashraf - scheduled patient for EMU  Mom requested she be able to stay with patient for admission  States when previously scheduled she was given permission

## 2021-05-10 NOTE — TELEPHONE ENCOUNTER
Attempted to start EMU authorization [phone] the insurance company system is down     Recording stated to try again later

## 2021-05-13 NOTE — TELEPHONE ENCOUNTER
Fax received from insurance company this morning  Completed form, faxed back attached all clinicals   [phone]

## 2021-05-13 NOTE — TELEPHONE ENCOUNTER
Received call from [address] D:  She advised that authorization is not required for the procedure [id number]   Authorization is required for the elective hospital stay and advised that the hospital end will need to start that authorization with the patient's facesheet and other information and it will need to be faxed to [phone]

## 2021-05-14 NOTE — TELEPHONE ENCOUNTER
Received call from [address] D:  She advised that authorization is not required for the procedure [id number]  Authorization is required for the elective hospital stay and advised that the hospital end will need to start that authorization with the patient's facesheet and other information and it will need to be faxed to [phone] do not have to do anything  The hospital is responsible to obtain the auth at this point  l

## 2021-05-26 NOTE — TELEPHONE ENCOUNTER
Re checked the EMU authorization per Mickey Mcbride and José Miguel  Spoke to Bobbi Loco  Who spoke directly to Vidhya TREADWELL  In the Care [employer] dept  She verified the information that Moreno TREADWELL   05/14/2021   "She advised that authorization is not required for the procedure [id number]   Authorization is required for the elective hospital stay and advised that the hospital end will need to start that authorization with the patient's facesheet and other information and it will need to be faxed to [phone]"    Call ref for call with Marimar BERMEO   [phone]

## 2021-06-11 NOTE — TELEPHONE ENCOUNTER
Spoke to mom  Informed of previous  Confirmed date and time of admission on Monday at 9am  No further questions at this time

## 2021-06-14 ENCOUNTER — HOSPITAL ENCOUNTER (INPATIENT)
Facility: HOSPITAL | Age: 31
LOS: 2 days | Discharge: HOME | DRG: 101 | End: 2021-06-16
Attending: PSYCHIATRY & NEUROLOGY | Admitting: PSYCHIATRY & NEUROLOGY
Payer: COMMERCIAL

## 2021-06-14 ENCOUNTER — APPOINTMENT (INPATIENT)
Dept: NEUROLOGY | Facility: CLINIC | Age: 31
DRG: 101 | End: 2021-06-14
Payer: COMMERCIAL

## 2021-06-14 DIAGNOSIS — G40.009 LOCALIZATION-RELATED (FOCAL) (PARTIAL) IDIOPATHIC EPILEPSY AND EPILEPTIC SYNDROMES WITH SEIZURES OF LOCALIZED ONSET, NOT INTRACTABLE, WITHOUT STATUS EPILEPTICUS (HCC): Primary | ICD-10-CM

## 2021-06-14 LAB
ALBUMIN SERPL BCP-MCNC: 4.7 G/DL (ref 3.5–5)
ALP SERPL-CCNC: 81 U/L (ref 46–116)
ALT SERPL W P-5'-P-CCNC: 33 U/L (ref 12–78)
ANION GAP SERPL CALCULATED.3IONS-SCNC: 3 MMOL/L (ref 4–13)
AST SERPL W P-5'-P-CCNC: 15 U/L (ref 5–45)
ATRIAL RATE: 62 BPM
BASOPHILS # BLD AUTO: 0.04 THOUSANDS/ΜL (ref 0–0.1)
BASOPHILS NFR BLD AUTO: 1 % (ref 0–1)
BILIRUB SERPL-MCNC: 0.29 MG/DL (ref 0.2–1)
BUN SERPL-MCNC: 11 MG/DL (ref 5–25)
CALCIUM SERPL-MCNC: 9.6 MG/DL (ref 8.3–10.1)
CHLORIDE SERPL-SCNC: 109 MMOL/L (ref 100–108)
CO2 SERPL-SCNC: 28 MMOL/L (ref 21–32)
CREAT SERPL-MCNC: 0.9 MG/DL (ref 0.6–1.3)
EOSINOPHIL # BLD AUTO: 0.1 THOUSAND/ΜL (ref 0–0.61)
EOSINOPHIL NFR BLD AUTO: 1 % (ref 0–6)
ERYTHROCYTE [DISTWIDTH] IN BLOOD BY AUTOMATED COUNT: 12.7 % (ref 11.6–15.1)
GFR SERPL CREATININE-BSD FRML MDRD: 113 ML/MIN/1.73SQ M
GLUCOSE SERPL-MCNC: 81 MG/DL (ref 65–140)
HCT VFR BLD AUTO: 43.2 % (ref 36.5–49.3)
HGB BLD-MCNC: 14.1 G/DL (ref 12–17)
IMM GRANULOCYTES # BLD AUTO: 0.02 THOUSAND/UL (ref 0–0.2)
IMM GRANULOCYTES NFR BLD AUTO: 0 % (ref 0–2)
LYMPHOCYTES # BLD AUTO: 1.64 THOUSANDS/ΜL (ref 0.6–4.47)
LYMPHOCYTES NFR BLD AUTO: 21 % (ref 14–44)
MCH RBC QN AUTO: 28.5 PG (ref 26.8–34.3)
MCHC RBC AUTO-ENTMCNC: 32.6 G/DL (ref 31.4–37.4)
MCV RBC AUTO: 87 FL (ref 82–98)
MONOCYTES # BLD AUTO: 0.64 THOUSAND/ΜL (ref 0.17–1.22)
MONOCYTES NFR BLD AUTO: 8 % (ref 4–12)
NEUTROPHILS # BLD AUTO: 5.51 THOUSANDS/ΜL (ref 1.85–7.62)
NEUTS SEG NFR BLD AUTO: 69 % (ref 43–75)
NRBC BLD AUTO-RTO: 0 /100 WBCS
P AXIS: 22 DEGREES
PLATELET # BLD AUTO: 238 THOUSANDS/UL (ref 149–390)
PMV BLD AUTO: 9.4 FL (ref 8.9–12.7)
POTASSIUM SERPL-SCNC: 4.1 MMOL/L (ref 3.5–5.3)
PR INTERVAL: 152 MS
PROT SERPL-MCNC: 8.9 G/DL (ref 6.4–8.2)
QRS AXIS: 52 DEGREES
QRSD INTERVAL: 106 MS
QT INTERVAL: 422 MS
QTC INTERVAL: 428 MS
RBC # BLD AUTO: 4.95 MILLION/UL (ref 3.88–5.62)
SODIUM SERPL-SCNC: 140 MMOL/L (ref 136–145)
T WAVE AXIS: 21 DEGREES
VENTRICULAR RATE: 62 BPM
WBC # BLD AUTO: 7.95 THOUSAND/UL (ref 4.31–10.16)

## 2021-06-14 PROCEDURE — 93005 ELECTROCARDIOGRAM TRACING: CPT

## 2021-06-14 PROCEDURE — 99222 1ST HOSP IP/OBS MODERATE 55: CPT | Performed by: PSYCHIATRY & NEUROLOGY

## 2021-06-14 PROCEDURE — 80177 DRUG SCRN QUAN LEVETIRACETAM: CPT | Performed by: NURSE PRACTITIONER

## 2021-06-14 PROCEDURE — 93010 ELECTROCARDIOGRAM REPORT: CPT | Performed by: INTERNAL MEDICINE

## 2021-06-14 PROCEDURE — 80235 DRUG ASSAY LACOSAMIDE: CPT | Performed by: NURSE PRACTITIONER

## 2021-06-14 PROCEDURE — 80053 COMPREHEN METABOLIC PANEL: CPT | Performed by: NURSE PRACTITIONER

## 2021-06-14 PROCEDURE — 85025 COMPLETE CBC W/AUTO DIFF WBC: CPT | Performed by: NURSE PRACTITIONER

## 2021-06-14 PROCEDURE — 95700 EEG CONT REC W/VID EEG TECH: CPT

## 2021-06-14 PROCEDURE — 95715 VEEG EA 12-26HR INTMT MNTR: CPT

## 2021-06-14 RX ORDER — DIPHENHYDRAMINE HCL 25 MG
25 TABLET ORAL EVERY 6 HOURS PRN
Status: DISCONTINUED | OUTPATIENT
Start: 2021-06-14 | End: 2021-06-16 | Stop reason: HOSPADM

## 2021-06-14 RX ORDER — ONDANSETRON 2 MG/ML
4 INJECTION INTRAMUSCULAR; INTRAVENOUS EVERY 6 HOURS PRN
Status: DISCONTINUED | OUTPATIENT
Start: 2021-06-14 | End: 2021-06-16 | Stop reason: HOSPADM

## 2021-06-14 RX ORDER — ACETAMINOPHEN 325 MG/1
650 TABLET ORAL EVERY 6 HOURS PRN
Status: DISCONTINUED | OUTPATIENT
Start: 2021-06-14 | End: 2021-06-16 | Stop reason: HOSPADM

## 2021-06-14 RX ORDER — LEVETIRACETAM 750 MG/1
1500 TABLET ORAL DAILY
Status: DISCONTINUED | OUTPATIENT
Start: 2021-06-15 | End: 2021-06-16 | Stop reason: HOSPADM

## 2021-06-14 RX ORDER — BIOTIN 5 MG
2 TABLET ORAL DAILY
Status: DISCONTINUED | OUTPATIENT
Start: 2021-06-15 | End: 2021-06-14

## 2021-06-14 RX ORDER — ASCORBIC ACID 500 MG
3000 TABLET ORAL DAILY
Status: DISCONTINUED | OUTPATIENT
Start: 2021-06-15 | End: 2021-06-16 | Stop reason: HOSPADM

## 2021-06-14 RX ORDER — LORAZEPAM 2 MG/ML
2 INJECTION INTRAMUSCULAR EVERY 8 HOURS PRN
Status: DISCONTINUED | OUTPATIENT
Start: 2021-06-14 | End: 2021-06-16 | Stop reason: HOSPADM

## 2021-06-14 RX ORDER — MELATONIN
2000 DAILY
Status: DISCONTINUED | OUTPATIENT
Start: 2021-06-15 | End: 2021-06-16 | Stop reason: HOSPADM

## 2021-06-14 RX ORDER — LACOSAMIDE 150 MG/1
300 TABLET ORAL EVERY 12 HOURS SCHEDULED
Status: DISCONTINUED | OUTPATIENT
Start: 2021-06-14 | End: 2021-06-16 | Stop reason: HOSPADM

## 2021-06-14 RX ORDER — LACOSAMIDE 150 MG/1
300 TABLET ORAL EVERY 12 HOURS SCHEDULED
Status: DISCONTINUED | OUTPATIENT
Start: 2021-06-14 | End: 2021-06-14

## 2021-06-14 RX ORDER — POLYETHYLENE GLYCOL 3350 17 G/17G
17 POWDER, FOR SOLUTION ORAL DAILY PRN
Status: DISCONTINUED | OUTPATIENT
Start: 2021-06-14 | End: 2021-06-16 | Stop reason: HOSPADM

## 2021-06-14 RX ADMIN — LEVETIRACETAM 2000 MG: 500 TABLET, FILM COATED ORAL at 18:30

## 2021-06-14 RX ADMIN — LACOSAMIDE 300 MG: 150 TABLET, FILM COATED ORAL at 18:30

## 2021-06-14 NOTE — H&P
Epilepsy Admission H&P  Antwan Sy 27 y o  male   : 1990  MRN: [id number]     Unit/Bed#: [facility] 718-01    Encounter: [id number]     -------------------------------------------------------------------------------------------------------------------                Outpatient Neurologist:  Dr Herman Ball                  Primary Care Physician:  Cuong Galloway MD      CC:  Episodes concerning for seizures  -------------------------------------------------------------------------------------------------------------------  HPI:    Antwan Sy is a 27 y o  right handed male with epilepsy and intellectual disability/ static encephalopathy who is admitted to the Epilepsy Monitoring Unit for evaluation of episodes concerning for seizures  He is a patient of Dr Herman Ball  The patient was seen most recently in the office by Dr Herman Ball on 2021  To review his history, his first seizure was in 2005  He had seizure while sleeping on the [facility]  He was not started on an AED at that time  Next seizure was 2005 in [facility] at which time he was started on Phenytoin  He was seizure free until a few years later when he had a seizure after the H1N1 flu shot  Eventually transitioned to levetiracetam because phenytoin levels were hard to manage  Seizures were then controlled for a number of years        Seizures recurred in 10/2018 (nocturnal)  Seizure prior had been about 5 years earlier  No change in levetiracetam 1500 mg AM / 2000 mg PM       Focal seizures occurred in 3/2019 over 4-5 days  Couldn't figure how to get out  of his seat to let someone pass  At restaurant he had trouble holding his burger for less than a minute  Then dropped ball when bowling and said right hand felt funny  There were 2 more at home, funny feeling in right hand and vacant look, with apparent retained awareness  Levetiracetam increased to 2000 mg twice daily   On the higher dose he seemed tired and not as focused (mixed up laundry), but these symptoms did seem to improve  Lower dose was discussed, but they did not have him decrease       6/9/2019  3 seizures over 1 5 days in setting of fever and didn't return back to baseline in between the first 2 seizures  Called EMS  Taken to [facility]  Had 3rd seizure in the ED  Vimpat added   The patient established care with Dr Luiz Crow in November of 2019  Since then, his events concerning for focal seizures have continued to occur despite escalation of therapy  He is currently on lacosamide 300 mg BID and levetiracetam 1500 mg in the morning and 2000 mg at night  He did not tolerate 2000 mg levetiracetam BID  Due to being on high doses of 2 AEDs with continued uncontrolled events, Dr Luiz Crow recommended EMU admission at his visit back in October of 2020  There were health issues with the patient and family which warranted his admission being pushed to this date  His mother had quadruple bypass several months ago, father is currently recovering from illness requiring dialysis, and patient had COVID-19  His episodes concerning for focal seizures are occurring about 3 times per week (last event 6/11), there have been no generalized tonic clonic seizures out of sleep in over one year  Other than his episodes concerning for seizures, he has been doing well  He denies any other complaints or concerns  He has been unable to work during the COVID-19 pandemic and has had to stop many of his activities that he typically enjoys  He does do a zoom program 6 times per week and is eager to participate in the [employer] Olympics this coming year  History was also obtained from his mother, who was present at the time of admission       I reviewed her prior records including clinic notes from Dr Luiz Crow and myself, which are summarized and incorporated above      -------------------------------------------------------------------------------------------------------------------   Seizure/Spell Characteristics:     1  GTC seizure out of sleep - last event in March/April 2020  Not associated with tongue bite or urinary incontinence  No postictal soreness  The next day he will be tired and sleeps a lot, takes 24 hours to return to normal  2  Focal seizure- R hand funny feeling (may precede, but not recently), vacant look, may rub head, apparent retained awareness, but does not fully interact for a time  Usually sighs at the beginning and may stand up and then look of fear  They start to count and then he joins the counting between 10 and 20  Doesn't know that they happen  Says "I'm fine, I'm fine" after the events  After the events his hands are cold  Occurring about 3 times per week, lasting about 30 seconds  No postictal period  May occur close to evening time medications, but not always  Patient does not recall events  -------------------------------------------------------------------------------------------------------------------  Special Features  Status epilepticus: no  Self Injury Seizures: no  Precipitating Factors: stress    Epilepsy Risk Factors:  Full term, normal birth  Early intervention at about 18 months  Went to [facility]  Given diagnosis of mild/moderate MR  Walked and talked late  Talked at about 1 yo  2 years behind in school  Inclusion sometimes  Life skills program in middle and high school  Stopped school at 23  Prior to COVID-19 pandemic he was working 3 days/week, 4 hours each- 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  Obtained eagle       Prior AEDs:  Phenytoin    Prior Evaluation:  - MRI brain 5/2019 sedated:  Normal    - 05/21/2020 EEG awake and asleep (Dr Prescilla Severe):  Intermittent brief bursts of diffuse, right hemisphere dominant, 3-4 cps slowing as well as similar slowing isolated to the right hemisphere and maximal over the temporal region suggest underlying neuronal dysfunction with possible focal neuronal dysfunction in the right temporal region  Some of the bursts contain poorly formed, poorly localized, sharp components that are of uncertain significance       - 24 hour AEEG 6/30/2020 (Dr Danette Martinez):  Sporadic right temporal delta activities suggests an underlying area of neuronal dysfunction  The frequent runs of diffuse rhythmic theta activities that were maximal in the right temporal region are of unclear significance  These were typically brief and without clear evolution  Some features (diffuse distribution, abrupt start/stop, and on one occasion, increase in frequency over course of burst) would be suggestive of SREDA, however, patients age and short duration of this activity would be atypical for this variant  Overall, these are favored to be a benign variant, but the possibility of brief ictal rhythmic discharges cannot fully be excluded  A run of bilateral frontal delta activities is favored to represent artifact      - Video EEG: none  - PET scan brain : none  - Neuropsychologic testing: none    Psychiatric History:  None    -------------------------------------------------------------------------------------------------------------------  Current Medications:   No current facility-administered medications on file prior to encounter  Current Outpatient Medications on File Prior to Encounter   Medication Sig Dispense Refill    Ascorbic Acid (VITAMIN C) 1000 MG tablet Take 3,000 mg by mouth daily      Cholecalciferol (VITAMIN D3 PO) Take 2,000 Units by mouth daily      Krill Oil 1000 MG CAPS Take 2 capsules by mouth daily       lacosamide (Vimpat) 150 mg tablet Take two tablets (300 mg) by mouth every 12 hours 120 tablet 5    levETIRAcetam (KEPPRA) 1000 MG tablet Take 1 5 tablets (1,500 mg total) by mouth every morning AND 2 tablets (2,000 mg total) daily at bedtime   315 tablet 3    [DISCONTINUED] Ferrous Sulfate (IRON PO) Take 65 mg by mouth daily ------------------------------------------------------------------------------------------------------------------  Past Medical / Surgical History/Social History/Family History:    Past Medical History:   Diagnosis Date    Hyperlipidemia      Past Surgical History:   Procedure Laterality Date    NO PAST SURGERIES       Social History     Socioeconomic History    Marital status: Single     Spouse name: Not on file    Number of children: Not on file    Years of education: Not on file    Highest education level: High school graduate   Occupational History    Occupation:    Tobacco Use    Smoking status: Never Smoker    Smokeless tobacco: Never Used   Vaping Use    Vaping Use: Never used   Substance and Sexual Activity    Alcohol use: Never    Drug use: Never    Sexual activity: Not on file   Other Topics Concern    Not on file   Social History Narrative    Not on file     Social Determinants of Health     Financial Resource Strain:     Difficulty of Paying Living Expenses:    Food Insecurity:     Worried About [address] in the Last Year:     [facility] [facility] in the Last Year:    Transportation Needs:     Lack of Transportation (Medical):      Lack of Transportation (Non-Medical):    Physical Activity:     Days of Exercise per Week:     Minutes of Exercise per Session:    Stress:     Feeling of Stress :    Social Connections:     Frequency of Communication with Friends and Family:     Frequency of Social Gatherings with Friends and Family:     Attends Confucianist Services:     Active Member of Clubs or Organizations:     Attends Club or Organization Meetings:     Marital Status:    Intimate Partner Violence:     Fear of Current or Ex-Partner:     Emotionally Abused:     Physically Abused:     Sexually Abused:      Family History   Problem Relation Age of Onset    Diabetes Mother     Hypertension Mother     Heart attack Mother     Diabetes Father    German Duarte Hypertension Father     No Known Problems Sister     No Known Problems Brother     Hyperlipidemia Maternal Grandmother     Hypertension Maternal Grandfather     Cancer Paternal Grandfather        Allergies:  No Known Allergies       ------------------------------------------------------------------------------------------------------------------  ROS:  A 12 system review of system was obtained and otherwise negative except as per HPI     ------------------------------------------------------------------------------------------------------------------  VITALS:  There were no vitals taken for this visit  GENERAL EXAMINATION:   In general patient is well appearing and in no distress  Heart RRR w/o MRG  Lungs CTA w/o WRR  Abdomen soft, NT, normoactive BS  There is no peripheral edema  NEUROLOGIC EXAMINATION:     Alert and oriented to person, date, location  Limited understanding of medical situation  Recent and remote memory were intact    Mood and affect are appropriate  Attention is limited, easily distracted  Language fluency intact  Difficulty following verbal and visual commands during exam      Cranial nerves: Pupils are equal round reactive to light and accommodation  Extraocular movements are intact without nystagmus  Facial sensation is intact to light touch  No facial droop, face activates symmetrically  There is no dysarthria  Hearing was intact to finger rub  Tongue and uvula are midline and palate elevates symmetrically  Shoulder shrug  5/5  Motor Exam:  No pronator drift  Bulk and tone are normal  Unable to formally test strength due to difficulty following strength testing commands  Deep tendon reflexes: Biceps 2+, brachioradialis 2+, patellar 2+, Achilles 2+ bilaterally  Sensation: Intact light touch    Coordination: Finger nose finger without dysmetria  Gait:  Normal casual gait  ------------------------------------------------------------------------------------------------------------------  Labs:                   -------------------------------------------------------------------------------------------------------------------  Impression and Plan:  Marcos Dacosta is a 27 y o  right handed male with idiopathic epilepsy in the setting of mild intellectual disability who is admitted to the Epilepsy Monitoring Unit for evaluation of episodes concerning for seizures  Neurologic examination was unrevealing with exception of some difficulty following commmands during exam  His mother is to Odetteherb Reid at the bedside throughout his admission  The patient has had generalized tonic clonic seizures in the past but he began having episodes concerning for simple partial seizures  He is currently on high doses of lacosamide and levetiracetam and continues to have these episodes about 3 times per week, most recently on 6/11  He has not have a generalized tonic clonic seizure out of sleep in over one year  His prior MRI brain was reportedly normal in 2019  He did have a routine EEG in May 2020 with intermittent brief bursts of diffuse, right hemisphere dominant, 3-4 cps slowing as well as similar slowing isolated to the right hemisphere and maximal over the temporal region suggest underlying neuronal dysfunction with possible focal neuronal dysfunction in the right temporal region  Some of the bursts contained poorly formed, poorly localized, sharp components that were of uncertain significance  An ambulatory EEG in June of 2020 revealed sporadic right temporal delta activities suggesting an underlying area of neuronal dysfunction  The frequent runs of diffuse rhythmic theta activities that were maximal in the right temporal region were noted to be of unclear significance      These events concerning for focal seizures seem to be associated with stress or being obsessed with something/negative thoughts  To guide further treatment, his episodes concerning for seizures are to be captured on video EEG monitoring for characterization  1)  Spells/Seizures:   1) Will start continuous video EEG monitoring to capture and characterize events  2) Will start single lead EKG monitoring   3) Medications:     - On admission: lacosamide 300 mg BID, levetiracetam 1500 mg in the morning and 2000 mg at night  4) Additional diagnostic tests:  Photic stim, HV  5) Seizure/fall/status epilepticus precautions  6) Will order Ativan 2 mg as needed for more than two complex partial seizures or 1 Generalized tonic clonic seizure in a 24 hour period  7) Check labs with AED drug levels, CBC and CMP    2)  Co morbidities:   1) Mild intellectual disability- continue supportive care  Mother to remain at bedside during admission      3) DVT prophylaxis: Lovenox 40 mg daily  SCDs while in bed      Code status: FULL CODE    Total time spent: At least 40 minutes, with more than 50% spent counseling/coordinating care   Plan of care reviewed with nursing staff      -------------------------------------------------------------------------------------------------------------------    Sherita EstersGABRIELLE             Date: 6/14/2021     Time: 10:01 AM  [address] [facility] [place]

## 2021-06-15 ENCOUNTER — APPOINTMENT (INPATIENT)
Dept: NEUROLOGY | Facility: CLINIC | Age: 31
DRG: 101 | End: 2021-06-15
Payer: COMMERCIAL

## 2021-06-15 PROCEDURE — 95715 VEEG EA 12-26HR INTMT MNTR: CPT

## 2021-06-15 PROCEDURE — 99233 SBSQ HOSP IP/OBS HIGH 50: CPT | Performed by: PSYCHIATRY & NEUROLOGY

## 2021-06-15 RX ORDER — DIVALPROEX SODIUM 500 MG/1
1000 TABLET, DELAYED RELEASE ORAL EVERY 12 HOURS SCHEDULED
Status: DISCONTINUED | OUTPATIENT
Start: 2021-06-15 | End: 2021-06-16 | Stop reason: HOSPADM

## 2021-06-15 RX ADMIN — OXYCODONE HYDROCHLORIDE AND ACETAMINOPHEN 3000 MG: 500 TABLET ORAL at 08:18

## 2021-06-15 RX ADMIN — LACOSAMIDE 300 MG: 150 TABLET, FILM COATED ORAL at 08:19

## 2021-06-15 RX ADMIN — LACOSAMIDE 300 MG: 150 TABLET, FILM COATED ORAL at 18:00

## 2021-06-15 RX ADMIN — Medication 2000 UNITS: at 08:18

## 2021-06-15 RX ADMIN — DIVALPROEX SODIUM 1000 MG: 500 TABLET, DELAYED RELEASE ORAL at 21:36

## 2021-06-15 RX ADMIN — LEVETIRACETAM 1500 MG: 750 TABLET ORAL at 08:18

## 2021-06-15 RX ADMIN — DIVALPROEX SODIUM 1000 MG: 500 TABLET, DELAYED RELEASE ORAL at 10:40

## 2021-06-15 RX ADMIN — LEVETIRACETAM 2000 MG: 500 TABLET, FILM COATED ORAL at 18:00

## 2021-06-15 NOTE — PROGRESS NOTES
EMU Daily Progress Note   Marylin Brand 27 y o  male   : 1990  MRN: [id number]     Unit/Bed#: [facility] 718-01    Encounter: [id number]  -------------------------------------------------------------------------------------------------------------------  Interval History:      Patient is feeling well this morning  He denies any complaints or concerns  Mother is at bedside  Shakira Rausch did have 2 seizures back to back yesterday afternoon  Rhythmic activity most prominent over the temporal lobes  There were some abnormalities during sleep as well  The mother was able to press the seizure alert button when the patient started staring  There were some abnormal lip movements suggestive of automatisms  There was difficulty communicating  He also turned his head to the right and was using his right hand to touch his head during the event  Towards the end of the seizures he was able to count with his mother  When the seizure subsided he stated "I'm fine " Unfortunately formal ictal testing was not completed during these events  Since he is currently on high doses of both levetiracetam and lacosamide and continues to have seizures, will add depakote  Starting depakote at 1000 mg twice per day  Discussed plan with patient and mother  Plan to monitor overnight while on new medication  Vital signs stable since admission  Labs on admission (levetiracetam and lacosamide levels pending):  Results for Wendi Damian (MRN [id number])   Ref   Range 2021 10:39   Sodium Latest Ref Range: 136 - 145 mmol/L 140   Potassium Latest Ref Range: 3 5 - 5 3 mmol/L 4 1   Chloride Latest Ref Range: 100 - 108 mmol/L 109 (H)   CO2 Latest Ref Range: 21 - 32 mmol/L 28   Anion Gap Latest Ref Range: 4 - 13 mmol/L 3 (L)   BUN Latest Ref Range: 5 - 25 mg/dL 11   Creatinine Latest Ref Range: 0 60 - 1 30 mg/dL 0 90   Glucose, Random Latest Ref Range: 65 - 140 mg/dL 81   Calcium Latest Ref Range: 8 3 - 10 1 mg/dL 9 6   AST Latest Ref Range: 5 - 45 U/L 15   ALT Latest Ref Range: 12 - 78 U/L 33   Alkaline Phosphatase Latest Ref Range: 46 - 116 U/L 81   Total Protein Latest Ref Range: 6 4 - 8 2 g/dL 8 9 (H)   Albumin Latest Ref Range: 3 5 - 5 0 g/dL 4 7   TOTAL BILIRUBIN Latest Ref Range: 0 20 - 1 00 mg/dL 0 29   eGFR Latest Units: ml/min/1 73sq m 113   WBC Latest Ref Range: 4 31 - 10 16 Thousand/uL 7 95   Red Blood Cell Count Latest Ref Range: 3 88 - 5 62 Million/uL 4 95   Hemoglobin Latest Ref Range: 12 0 - 17 0 g/dL 14 1   HCT Latest Ref Range: 36 5 - 49 3 % 43 2   MCV Latest Ref Range: 82 - 98 fL 87   MCH Latest Ref Range: 26 8 - 34 3 pg 28 5   MCHC Latest Ref Range: 31 4 - 37 4 g/dL 32 6   RDW Latest Ref Range: 11 6 - 15 1 % 12 7   Platelet Count Latest Ref Range: 149 - 390 Thousands/uL 238   MPV Latest Ref Range: 8 9 - 12 7 fL 9 4   nRBC Latest Units: /100 WBCs 0   Neutrophils % Latest Ref Range: 43 - 75 % 69   Immat GRANS % Latest Ref Range: 0 - 2 % 0   Lymphocytes Relative Latest Ref Range: 14 - 44 % 21   Monocytes Relative Latest Ref Range: 4 - 12 % 8   Eosinophils Latest Ref Range: 0 - 6 % 1   Basophils Relative Latest Ref Range: 0 - 1 % 1   Immature Grans Absolute Latest Ref Range: 0 00 - 0 20 Thousand/uL 0 02   Absolute Neutrophils Latest Ref Range: 1 85 - 7 62 Thousands/µL 5 51   Lymphocytes Absolute Latest Ref Range: 0 60 - 4 47 Thousands/µL 1 64   Absolute Monocytes Latest Ref Range: 0 17 - 1 22 Thousand/µL 0 64   Absolute Eosinophils Latest Ref Range: 0 00 - 0 61 Thousand/µL 0 10   Basophils Absolute Latest Ref Range: 0 00 - 0 10 Thousands/µL 0 04     -------------------------------------------------------------------------------------------------------------------  Past Medical history, surgical history, family history, and social history are unchanged from admission H&P     ROS:  A 12 system review of system was obtained and unrevealing        All other review of systems negative -------------------------------------------------------------------------------------------------------------------  Allergies: No Known Allergies   Scheduled Meds:   Current Facility-Administered Medications   Medication Dose Route Frequency Provider Last Rate    acetaminophen  650 mg Oral Q6H PRN Anai Randolph, CRNP      vitamin C  3,000 mg Oral Daily Anai Randolph, CRNP      cholecalciferol  2,000 Units Oral Daily Anai Randolph, CRNP      diphenhydrAMINE  25 mg Oral Q6H PRN Anai Randolph, CRNP      enoxaparin  40 mg Subcutaneous Daily Anai Randolph, CRNP      lacosamide  300 mg Oral Q12H [facility] Russell Cam MD      levETIRAcetam  1,500 mg Oral Daily Anai Randolph, GABRIELLE      levETIRAcetam  2,000 mg Oral HS Anai Randolph, CRNP      LORazepam  2 mg Intravenous Q8H PRN Anai Randolph, CRNP      ondansetron  4 mg Intravenous Q6H PRN Anai Randolph, CRNP      polyethylene glycol  17 g Oral Daily PRN Anai Randolph, CRNP       PRN Meds:   acetaminophen    diphenhydrAMINE    LORazepam    ondansetron    polyethylene glycol  -------------------------------------------------------------------------------------------------------------------  Physical Exam:  Vitals: Blood pressure 137/86, pulse 67, temperature (!) 97 2 °F (36 2 °C), resp  rate 20, height 6' 1" (1 854 m), weight 94 8 kg (209 lb), SpO2 96 %  No apparent distress  Appears well nourished  Mood appropriate for situation  No peripheral edema  Neurologic Exam  Mental status- alert and oriented to person, place, and time  Speech appropriate for conversation  Good attention and knowledge  Cranial Nerves- PERRL, VFFTC, EOMS normal, facial sensation and muscles symmetric, hearing intact bilaterally to finger rubs, tongue midline, palate rise symmetrical, shoulder shrug symmetrical     Motor- No pronator drift  Appropriate strength  Moves all extremities freely  No tremor      Sensory-  Intact distally in all extremities to light touch      DTRs- Not assessed this morning  Gait- Normal casual gait  Coordination- FNF intact             -------------------------------------------------------------------------------------------------------------------  Assessment/Plan:    Due to seizures and abnormalities during sleep, will have patient start on depakote 1000 mg BID in addition to his current AED regimen  Plan to continue monitoring overnight and possibly discharge tomorrow morning  1)  Spells/Seizures:   1) Continue video EEG monitoring to capture and characterize events  2) Continue single lead EKG monitoring  EKG on admission NSR -   3) Medications:                - On admission: lacosamide 300 mg BID, levetiracetam 1500 mg in the morning and 2000 mg at night   - 6/15: continue home medications  Add depakote 1000 mg BID  4) Additional diagnostic tests:  none at this time  5) Seizure/fall/status epilepticus precautions  6) Ativan 2 mg as needed for more than two complex partial seizures or 1 Generalized tonic clonic seizure in a 24 hour period       2)  Co morbidities:   1) Mild intellectual disability- continue supportive care  Mother to remain at bedside during admission     3) DVT prophylaxis: Lovenox 40 mg daily  SCDs while in bed      Code status: FULL CODE    Total time spent: At least 25 minutes, with more than 50% spent counseling/coordinating care   Plan of care discussed with nursing staff      -------------------------------------------------------------------------------------------------------------------  GABRIELLE Garcia        Date: 6/15/2021     Time: 9:23 AM   [address] [facility]

## 2021-06-15 NOTE — PLAN OF CARE
Problem: SAFETY ADULT  Goal: Patient will remain free of falls  Description: INTERVENTIONS:  - Educate patient/family on patient safety including physical limitations  - Instruct patient to call for assistance with activity   - Consult OT/PT to assist with strengthening/mobility   - Keep Call bell within reach  - Keep bed low and locked with side rails adjusted as appropriate  - Keep care items and personal belongings within reach  - Initiate and maintain comfort rounds  - Make Fall Risk Sign visible to staff  - Offer Toileting every Hours, in advance of need  - Initiate/Maintain alarm  - Obtain necessary fall risk management equipment:   - Apply yellow socks and bracelet for high fall risk patients  - Consider moving patient to room near nurses station  Outcome: Progressing  Goal: Maintain or return to baseline ADL function  Description: INTERVENTIONS:  -  Assess patient's ability to carry out ADLs; assess patient's baseline for ADL function and identify physical deficits which impact ability to perform ADLs (bathing, care of mouth/teeth, toileting, grooming, dressing, etc )  - Assess/evaluate cause of self-care deficits   - Assess range of motion  - Assess patient's mobility; develop plan if impaired  - Assess patient's need for assistive devices and provide as appropriate  - Encourage maximum independence but intervene and supervise when necessary  - Involve family in performance of ADLs  - Assess for home care needs following discharge   - Consider OT consult to assist with ADL evaluation and planning for discharge  - Provide patient education as appropriate  Outcome: Progressing  Goal: Maintains/Returns to pre admission functional level  Description: INTERVENTIONS:  - Perform BMAT or MOVE assessment daily    - Set and communicate daily mobility goal to care team and patient/family/caregiver  - Collaborate with rehabilitation services on mobility goals if consulted  - Perform Range of Motion  times a day    - Reposition patient every  hours  - Dangle patient  times a day  - Stand patient  times a day  - Ambulate patient  times a day  - Out of bed to chair  times a day   - Out of bed for meals  times a day  - Out of bed for toileting  - Record patient progress and toleration of activity level   Outcome: Progressing     Problem: DISCHARGE PLANNING  Goal: Discharge to home or other facility with appropriate resources  Description: INTERVENTIONS:  - Identify barriers to discharge w/patient and caregiver  - Arrange for needed discharge resources and transportation as appropriate  - Identify discharge learning needs (meds, wound care, etc )  - Arrange for interpretive services to assist at discharge as needed  - Refer to Case Management Department for coordinating discharge planning if the patient needs post-hospital services based on physician/advanced practitioner order or complex needs related to functional status, cognitive ability, or social support system  Outcome: Progressing     Problem: Knowledge Deficit  Goal: Patient/family/caregiver demonstrates understanding of disease process, treatment plan, medications, and discharge instructions  Description: Complete learning assessment and assess knowledge base    Interventions:  - Provide teaching at level of understanding  - Provide teaching via preferred learning methods  Outcome: Progressing     Problem: NEUROSENSORY - ADULT  Goal: Achieves stable or improved neurological status  Description: INTERVENTIONS  - Monitor and report changes in neurological status  - Monitor vital signs such as temperature, blood pressure, glucose, and any other labs ordered   - Initiate measures to prevent increased intracranial pressure  - Monitor for seizure activity and implement precautions if appropriate      Outcome: Progressing  Goal: Remains free of injury related to seizures activity  Description: INTERVENTIONS  - Maintain airway, patient safety  and administer oxygen as ordered  - Monitor patient for seizure activity, document and report duration and description of seizure to physician/advanced practitioner  - If seizure occurs,  ensure patient safety during seizure  - Reorient patient post seizure  - Seizure pads on all 4 side rails  - Instruct patient/family to notify RN of any seizure activity including if an aura is experienced  - Instruct patient/family to call for assistance with activity based on nursing assessment  - Administer anti-seizure medications if ordered    Outcome: Progressing  Goal: Achieves maximal functionality and self care  Description: INTERVENTIONS  - Monitor swallowing and airway patency with patient fatigue and changes in neurological status  - Encourage and assist patient to increase activity and self care     - Encourage visually impaired, hearing impaired and aphasic patients to use assistive/communication devices  Outcome: Progressing     Problem: MOBILITY - ADULT  Goal: Maintain or return to baseline ADL function  Description: INTERVENTIONS:  -  Assess patient's ability to carry out ADLs; assess patient's baseline for ADL function and identify physical deficits which impact ability to perform ADLs (bathing, care of mouth/teeth, toileting, grooming, dressing, etc )  - Assess/evaluate cause of self-care deficits   - Assess range of motion  - Assess patient's mobility; develop plan if impaired  - Assess patient's need for assistive devices and provide as appropriate  - Encourage maximum independence but intervene and supervise when necessary  - Involve family in performance of ADLs  - Assess for home care needs following discharge   - Consider OT consult to assist with ADL evaluation and planning for discharge  - Provide patient education as appropriate  Outcome: Progressing  Goal: Maintains/Returns to pre admission functional level  Description: INTERVENTIONS:  - Perform BMAT or MOVE assessment daily    - Set and communicate daily mobility goal to care team and patient/family/caregiver  - Collaborate with rehabilitation services on mobility goals if consulted  - Perform Range of Motion  times a day  - Reposition patient every  hours    - Dangle patient  times a day  - Stand patient  times a day  - Ambulate patient  times a day  - Out of bed to chair  times a day   - Out of bed for meals  times a day  - Out of bed for toileting  - Record patient progress and toleration of activity level   Outcome: Progressing

## 2021-06-15 NOTE — UTILIZATION REVIEW
Initial Clinical Review    Admission: Date/Time/Statement:   Admission Orders (From admission, onward)     Ordered        06/14/21 0951  Inpatient Admission  Once                   Orders Placed This Encounter   Procedures    Inpatient Admission     Standing Status:   Standing     Number of Occurrences:   1     Order Specific Question:   Level of Care     Answer:   Level 2 Stepdown / HOT [14]     Order Specific Question:   Bed Type     Answer:   EMU [8]     Order Specific Question:   Estimated length of stay     Answer:   More than 2 Midnights     Order Specific Question:   Certification     Answer:   I certify that inpatient services are medically necessary for this patient for a duration of greater than two midnights  See H&P and MD Progress Notes for additional information about the patient's course of treatment  Initial Presentation: 27year old male directly admitted to [facility] unit Critical care step down for episodes concerning for seizures  H/O epilepsy, intellectual disability, has been having periods of seizures since 2005  MOst recently has been  taking lacosamide 300 mg BID and levetiracetam 1500 mg in the morning and 2000 mg at night  He did not tolerate 2000 mg levetiracetam BID  Was due to be admitted for epilepsy monitoring in October 2020, but due to family medical issues, unable to be admitted until now  Has been having 3 episodes a week of staring off, standing up with a look of fear and unable to recall the event  Start continuous video EEG monitoring, EKG  COntinue lacosamide and levetiracetam as they were  Ativan as needed  Check AED drug levels, CBC, CMP  Date:  6/15   Day 2:  No complaints or concerns today  Had 2 seizures back to back yesterday  Rhythmic activity most prominent over the temporal lobes  There were some abnormalities during sleep as well  The mother was able to press the seizure alert button when the patient started staring   There were some abnormal lip movements suggestive of automatisms  There was difficulty communicating  He also turned his head to the right and was using his right hand to touch his head during the event  Towards the end of the seizures he was able to count with his mother  When the seizure subsided he stated "I'm fine "  Add depakote  Continue to monitor overnight  Continuous video eeg monitor in progress          Temperature Pulse Respirations Blood Pressure SpO2   06/14/21 1125 06/14/21 1125 06/14/21 1125 06/14/21 1125 06/14/21 1125   97 9 °F (36 6 °C) 61 18 135/87 97 %      Temp Source Heart Rate Source Patient Position - Orthostatic VS BP Location FiO2 (%)   06/14/21 1125 06/14/21 1125 06/14/21 1125 06/14/21 1125 --   Oral Monitor Sitting Right arm       Pain Score       06/14/21 0853       No Pain          Wt Readings from Last 1 Encounters:   06/14/21 94 8 kg (209 lb)     Additional Vital Signs:   Date/Time  Temp  Pulse  Resp  BP  MAP (mmHg)  SpO2  O2 Device  Patient Position - Orthostatic VS   06/15/21 07:22:28  97 2 °F (36 2 °C)Abnormal   67  20  137/86  103  96 %  --  --   06/14/21 21:47:57  98 °F (36 7 °C)  65  --  137/86  103  96 %  --  --   06/14/21 2000  --  --  --  --  --  --  None (Room air)  --   06/14/21 19:14:07  --  71  --  142/91  108  95 %  --  --   06/14/21 15:50:52  --  71  --  143/92  109  99 %  --  --   06/14/21 11:25:07  97 9 °F (36 6 °C)  61  18  135/87  103  97 %  None (Room air)       Pertinent Labs/Diagnostic Test Results:   6/14 EKG: Normal sinus rhythm  Normal ECG  No previous ECGs available      Results from last 7 days   Lab Units 06/14/21  1039   WBC Thousand/uL 7 95   HEMOGLOBIN g/dL 14 1   HEMATOCRIT % 43 2   PLATELETS Thousands/uL 238   NEUTROS ABS Thousands/µL 5 51         Results from last 7 days   Lab Units 06/14/21  1039   SODIUM mmol/L 140   POTASSIUM mmol/L 4 1   CHLORIDE mmol/L 109*   CO2 mmol/L 28   ANION GAP mmol/L 3*   BUN mg/dL 11   CREATININE mg/dL 0 90   EGFR ml/min/1 73sq m 113   CALCIUM mg/dL 9 6 Results from last 7 days   Lab Units 06/14/21  1039   AST U/L 15   ALT U/L 33   ALK PHOS U/L 81   TOTAL PROTEIN g/dL 8 9*   ALBUMIN g/dL 4 7   TOTAL BILIRUBIN mg/dL 0 29         Results from last 7 days   Lab Units 06/14/21  1039   GLUCOSE RANDOM mg/dL 81         Past Medical History:   Diagnosis Date    Hyperlipidemia        Admitting Diagnosis: Partial idiopathic epilepsy with seizures of localized onset ([facility] [address] ) [G40 009]  Age/Sex: 27 y o  male  Admission Orders:  Scheduled Medications:  vitamin C, 3,000 mg, Oral, Daily  cholecalciferol, 2,000 Units, Oral, Daily  divalproex sodium, 1,000 mg, Oral, Q12H HAM  enoxaparin, 40 mg, Subcutaneous, Daily  lacosamide, 300 mg, Oral, Q12H HAM  levETIRAcetam, 1,500 mg, Oral, Daily  levETIRAcetam, 2,000 mg, Oral, HS      Continuous IV Infusions:     PRN Meds:  acetaminophen, 650 mg, Oral, Q6H PRN  diphenhydrAMINE, 25 mg, Oral, Q6H PRN  LORazepam, 2 mg, Intravenous, Q8H PRN  ondansetron, 4 mg, Intravenous, Q6H PRN  polyethylene glycol, 17 g, Oral, Daily PRN          Network Utilization Review Department  ATTENTION: Please call with any questions or concerns to [phone] and carefully listen to the prompts so that you are directed to the right person  All voicemails are confidential   [facility] all requests for admission clinical reviews, approved or denied determinations and any other requests to dedicated fax number below belonging to the campus where the patient is receiving treatment   List of dedicated fax numbers for the Facilities:  [address] DENIALS (Administrative/Medical Necessity) [phone]   [address] [place] [address] (Maternity/NICU/Pediatrics) 261 [facility] [place] [address] [facility] Pieter Luis 7898 [id number] [facility] [place] [address] [address] [place] [address] [phone]

## 2021-06-15 NOTE — UTILIZATION REVIEW
Inpatient Admission Authorization Request   NOTIFICATION OF INPATIENT ADMISSION/INPATIENT AUTHORIZATION REQUEST   SERVICING FACILITY:   [facility]  Address: [address] [facility], [address] [place], [place] [address]  Tax ID: [id number]  NPI: [id number]  Place of Service: Inpatient [address] [facility] Code: 24     ATTENDING PROVIDER:  Attending Name and NPI#: Carline Nixon [[id number]]  Address: [address] [place] [address], [address] [place] [address]  Phone: [phone]     UTILIZATION REVIEW CONTACT:  Jordana Montoya Utilization   Network Utilization Review Department  Phone: [phone]  Fax: [phone]  Email: [email or web address]     PHYSICIAN ADVISORY SERVICES:  FOR OSQX-CI-RGLD REVIEW - MEDICAL NECESSITY DENIAL  Phone: [phone]  Fax: [phone]  Email: [email or web address]     TYPE OF REQUEST:  Inpatient Status     ADMISSION INFORMATION:  ADMISSION DATE/TIME: 6/14/21  8:48 AM  PATIENT DIAGNOSIS CODE/DESCRIPTION:  Partial idiopathic epilepsy with seizures of localized onset ([facility] ) [T85 [id number]]  DISCHARGE DATE/TIME: No discharge date for patient encounter  DISCHARGE DISPOSITION (IF DISCHARGED): Final discharge disposition not confirmed     IMPORTANT INFORMATION:  Please contact the Jordana Montoya directly with any questions or concerns regarding this request  Department voicemails are confidential     Send requests for admission clinical reviews, concurrent reviews, approvals, and administrative denials due to lack of clinical to fax [phone]

## 2021-06-16 VITALS
SYSTOLIC BLOOD PRESSURE: 127 MMHG | BODY MASS INDEX: 27.7 KG/M2 | DIASTOLIC BLOOD PRESSURE: 75 MMHG | HEART RATE: 73 BPM | RESPIRATION RATE: 18 BRPM | OXYGEN SATURATION: 96 % | TEMPERATURE: 98.2 F | WEIGHT: 209 LBS | HEIGHT: 73 IN

## 2021-06-16 PROCEDURE — 95720 EEG PHY/QHP EA INCR W/VEEG: CPT | Performed by: PSYCHIATRY & NEUROLOGY

## 2021-06-16 PROCEDURE — 99239 HOSP IP/OBS DSCHRG MGMT >30: CPT | Performed by: PSYCHIATRY & NEUROLOGY

## 2021-06-16 RX ORDER — DIVALPROEX SODIUM 500 MG/1
1000 TABLET, DELAYED RELEASE ORAL EVERY 12 HOURS SCHEDULED
Qty: 120 TABLET | Refills: 5 | Status: SHIPPED | OUTPATIENT
Start: 2021-06-16 | End: 2021-06-28 | Stop reason: SDUPTHER

## 2021-06-16 RX ADMIN — Medication 2000 UNITS: at 08:26

## 2021-06-16 RX ADMIN — LEVETIRACETAM 1500 MG: 750 TABLET ORAL at 08:26

## 2021-06-16 RX ADMIN — LACOSAMIDE 300 MG: 150 TABLET, FILM COATED ORAL at 08:26

## 2021-06-16 RX ADMIN — DIVALPROEX SODIUM 1000 MG: 500 TABLET, DELAYED RELEASE ORAL at 08:26

## 2021-06-16 RX ADMIN — OXYCODONE HYDROCHLORIDE AND ACETAMINOPHEN 3000 MG: 500 TABLET ORAL at 08:26

## 2021-06-16 NOTE — DISCHARGE SUMMARY
Epilepsy Attending Discharge Summary  Nima Linder 27 y o  male   : 1990  MRN: [id number]     [facility]    Encounter: [id number]    Date of Admission:   2021  Date of Discharge: 2021  Attending on Admission: Kristina Renteria MD   Attending on Discharge: Kristina Renteria MD     Admission Diagnosis:    1  Partial idiopathic epilepsy with seizures of localized onset ([facility] [address] ) [J77 233]    Discharge Diagnosis:  1  Localization-related (focal) (partial) idiopathic epilepsy and epileptic syndromes with seizures of localized onset, not intractable, without status epilepticus [G40 009]    Secondary Diagnoses:  1  Mild intellectual disabilities    Consultations:  1  None    Procedures:  1  Continuous Video EEG  2  Single lead ECG monitoring    Disposition:  Discharge to Home    Follow-up Appointments/Referrals:  1  Follow-up with [facility] as scheduled in September with Dr Nicolle Lei    Medication Changes:  1  Start Depakote 1000 mg twice per day    Discharge Medications:  See after visit summary for reconciled discharge medications provided to patient and family  Recommended follow-up testin  None    Brief History:  Per admission H&P:  Nima Linder is a 27 y o  right handed male with epilepsy and intellectual disability/ static encephalopathy who is admitted to the Epilepsy Monitoring Unit for evaluation of episodes concerning for seizures  He is a patient of Dr Nicolle Lei  The patient was seen most recently in the office by Dr Nicolle Lei on 2021       To review his history, his first seizure was in 2005  He had seizure while sleeping on the GWB  He was not started on an AED at that time  Next seizure was 2005 in [place] at which time he was started on Phenytoin  He was seizure free until a few years later when he had a seizure after the H1N1 flu shot  Eventually transitioned to levetiracetam because phenytoin levels were hard to manage   Seizures were then controlled for a number of years        Seizures recurred in 10/2018 (nocturnal)  Seizure prior had been about 5 years earlier  No change in levetiracetam 1500 mg AM / 2000 mg PM       Focal seizures occurred in 3/2019 over 4-5 days  Couldn't figure how to get out  of his seat to let someone pass  At restaurant he had trouble holding his burger for less than a minute  Then dropped ball when bowling and said right hand felt funny  There were 2 more at home, funny feeling in right hand and vacant look, with apparent retained awareness  Levetiracetam increased to 2000 mg twice daily  On the higher dose he seemed tired and not as focused (mixed up laundry), but these symptoms did seem to improve  Lower dose was discussed, but they did not have him decrease       6/9/2019  3 seizures over 1 5 days in setting of fever and didn't return back to baseline in between the first 2 seizures  Called EMS  [facility]  Had 3rd seizure in the ED  Vimpat added       The patient established care with Dr Izabel Glover in November of 2019  Since then, his events concerning for focal seizures have continued to occur despite escalation of therapy  He is currently on lacosamide 300 mg BID and levetiracetam 1500 mg in the morning and 2000 mg at night  He did not tolerate 2000 mg levetiracetam BID      Due to being on high doses of 2 AEDs with continued uncontrolled events, Dr Izabel Glover recommended EMU admission at his visit back in October of 2020  There were health issues with the patient and family which warranted his admission being pushed to this date  His mother had quadruple bypass several months ago, father is currently recovering from illness requiring dialysis, and patient had COVID-19   His episodes concerning for focal seizures are occurring about 3 times per week (last event 6/11), there have been no generalized tonic clonic seizures out of sleep in over one year       Other than his episodes concerning for seizures, he has been doing well  He denies any other complaints or concerns  Hospital Course:  Monty Alfred was admitted to the Epilepsy Monitoring Unit and monitored on continuous video EEG  Over the course of the admission, he had a total of 3 seizures  On the first day of admission, he had two seizures back to back, typical of his events at home  Ictal activity was diffuse, bitemporally maximal  On the morning day two, he was started on depakote 1000 mg BID in addition to his home medications of lacosamide 300 mg BID and levetiracetam 1500 mg in the morning and 2000 mg at night  He did have another typical seizure on day two around 5 pm  There are also rhythmic activities during sleep, similar to his seizures, rarely lasting up to 5-8 seconds without clinical correlate  Activation procedures:  - Photic stimulation    EMU CONCLUSION  Summary interictal findings:   Generalized sharp/spike and slow wave discharges as well as brief potentially ictal rhythmic discharges (BIRDs) occurring during drowsiness and sleep suggest underlying epileptogenic potential  Intermittent diffuse and mild independent right more than left polymorphic theta/delta activities may represent fragments of generalized discharges/slowing  Summary event findings: There were 4 brief electroclinical seizures (2 clusters of 2 seizures each) with diffuse bi hemispheric ictal rhyhtmic theta/alpha activity  The events were identified by the patient's mother as mild typical events  The seizures involve relative behavior arrest, mild facial expression change, restless movements, extending legs, touching face/head with right arm and possible expressive aphasia  The patient repeated "I'm fine" in response to prompts from his mother as the events ended       Seizure Classification:   Focal impaired aware (captured during this admission)  Generalized tonic clonic seizure (not captured during this admission, currently controlled)    Epilepsy Classification: Focal epilepsy with focal impaired aware seizures and bilateral tonic clonic seizures out of sleep versus generalized epilepsy  EMU findings and discussion:  Patient was admitted to the EMU for spell characterization  Since his events were occurring about 3 times per week at home, no medication weaning or sleep deprivation was required  He had a total of 4 brief typical seizures which occurred on day 1 (2 seizures) and day 2 (2 seizure) of admission  Since his seizures were not controlled on his home AED regimen of lacosamide 300 mg BID and levetiracetam 1500 mg in the morning and 2000 mg at night, an additional AED was added  AEDs were reviewed with patient and mother and ultimately divalproex was added at 1000 mg twice per day  Plan to continue this new medication at home and consider decreasing or stopping lacosamide at follow up if warranted  Recommend valproic acid level, CBC, and CMP in one week after discharge  Admission AED levels pending on discharge  Diet:  Regular    Activity:  as tolerated    Restrictions:  Seizure precautions at home    Discharge Statement   60 minutes discharging the patient  This time was spent on the day of discharge  More than 50% of the time was spent counseling/coordinating care  I had direct contact with the patient on the day of discharge  Time was spent specifically reviewing history, discussing test results and plan with patient and mother, and coordinating care  GABRIELLE Caceres   Date: 6/16/2021       Split/Shared Statement  I saw/examined the patient on 6/16/2021  I agree with the Advanced Practitioner's note and edited and made additions to the above       Marlene Penny MD   [facility]  [place] [address], [facility]rology and Epilepsy

## 2021-06-16 NOTE — DISCHARGE INSTRUCTIONS
During your admission, you had 2 episodes of brief seizures  Since these occurred while on your home medications, this tells us that you need an additional medication to control your seizures  You were started on depakote (divalproex) 1000 mg twice per day which you will continue at home  Please also continue your current doses of lacosamide (vimpat) 300 mg twice per day and levetiracetam (keppra) 1500 mg in the morning and 2000 mg at night  We would like for you to have blood work completed in one week to check your depakote level, blood counts, electrolytes, and kidney/liver function (valproic acid level, CBC, and CMP)  Any time you start a new medication, there is a possibility of side effects  Some common side effects of depakote include mild hand tremor, feeling tired, and headache  Some serious but uncommon side effects include elevations of liver enzymes or ammonia level, yellowing of the skin/ eyes, allergic reaction, confusion, and rash  If you have any possible side effects please call the office so we can determine if any adjustments need to be made  Please keep your follow up with Dr Ariana Argueta in September  If anything comes up in the meantime or if Timoteo Brand continues to have seizures or more frequent seizures, please call the office

## 2021-06-16 NOTE — CASE MANAGEMENT
LOS 2  Unplanned readmission risk score: 6  Not a bundle  CM was informed that pt is medically stable for dc today  No CM needs identified  Pt admitted electively for EMU  Pt resides with mom; pt performed ADL's indptly pta, mom assists as needed at home  Contact: Ying Mahoney (mother) [phone]  Pt's mom to transport home at dc

## 2021-06-17 LAB — LEVETIRACETAM SERPL-MCNC: 41.8 UG/ML (ref 10–40)

## 2021-06-17 PROCEDURE — 95720 EEG PHY/QHP EA INCR W/VEEG: CPT | Performed by: PSYCHIATRY & NEUROLOGY

## 2021-06-17 NOTE — UTILIZATION REVIEW
Notification of Discharge   This is a Notification of Discharge from our facility [address]  Please be advised that this patient has been discharge from our facility  Below you will find the admission and discharge date and time including the patients disposition  UTILIZATION REVIEW CONTACT:  Florian Kim  Utilization   Network Utilization Review Department  Phone: [phone] x carefully listen to the prompts  All voicemails are confidential   Email: [email or web address]     PHYSICIAN ADVISORY SERVICES:  FOR [id number] REVIEW - MEDICAL NECESSITY DENIAL  Phone: [phone]  Fax: [phone]  Email: [email or web address]     PRESENTATION DATE: 6/14/2021  8:48 AM  OBERVATION ADMISSION DATE:   INPATIENT ADMISSION DATE: 6/14/21  8:48 AM   DISCHARGE DATE: 6/16/2021 10:15 AM  DISPOSITION: Home/Self Care Home/Self Care      IMPORTANT INFORMATION:  Send all requests for admission clinical reviews, approved or denied determinations and any other requests to dedicated fax number below belonging to the campus where the patient is receiving treatment   List of dedicated fax numbers:  [address] DENIALS (Administrative/Medical Necessity) [phone]   [address] [place] [address] (Maternity/NICU/Pediatrics) [phone]   Kiko Reveles [phone]   Ryan Maldonado [phone]   [facility] [phone]   Edvin Florez [facility] 15[address] [facility] [phone]   [facility]  [phone]   [address] [facility]  2401 [facility] 1000 [address] [facility] [phone]

## 2021-06-18 LAB — LACOSAMIDE SERPL-MCNC: 12.3 UG/ML (ref 5–10)

## 2021-06-28 ENCOUNTER — TELEPHONE (OUTPATIENT)
Dept: NEUROLOGY | Facility: CLINIC | Age: 31
End: 2021-06-28

## 2021-06-28 DIAGNOSIS — G40.009 LOCALIZATION-RELATED (FOCAL) (PARTIAL) IDIOPATHIC EPILEPSY AND EPILEPTIC SYNDROMES WITH SEIZURES OF LOCALIZED ONSET, NOT INTRACTABLE, WITHOUT STATUS EPILEPTICUS (HCC): ICD-10-CM

## 2021-06-28 RX ORDER — DIVALPROEX SODIUM 500 MG/1
TABLET, DELAYED RELEASE ORAL
Qty: 315 TABLET | Refills: 0 | Status: SHIPPED | OUTPATIENT
Start: 2021-06-28 | End: 2021-10-20 | Stop reason: SDUPTHER

## 2021-06-28 NOTE — TELEPHONE ENCOUNTER
Called and discussed with the patient's mother  States that she thinks that he may be over medicated  Nori Nelson that his routines are off, and is not his typical self  States that she started to notice this last Thursday  Said that he got his blood drawn at 7 am and not midday  Taken before he took his morning dose      Please advise

## 2021-06-28 NOTE — TELEPHONE ENCOUNTER
Flakito Nichols  If this is a trough then we may want to decrease his dose a little bit  Lets start by decreasing his morning dose to 750 mg and continue with the 1000 mg at night time  How have his seizure been? Have her call us on Friday morning and let us know how he is doing on the decreased dose

## 2021-06-28 NOTE — TELEPHONE ENCOUNTER
----- Message from [facility] sent at 6/28/2021  2:57 PM EDT -----  CBC, CMP look good  Valproic acid level is slightly elevated but as long as he is not having side effects, his current dosing can be continued  Given the time the specimen was collected (mid day), this is likely a peak level

## 2021-06-29 NOTE — TELEPHONE ENCOUNTER
Called and discussed with patient's mother  She is agreeable to plan  States that he has not had any seizures but has been more temperamental  She will call and update us on Friday

## 2021-10-08 ENCOUNTER — TELEPHONE (OUTPATIENT)
Dept: NEUROLOGY | Facility: CLINIC | Age: 31
End: 2021-10-08

## 2021-10-08 DIAGNOSIS — G40.009 PARTIAL IDIOPATHIC EPILEPSY WITH SEIZURES OF LOCALIZED ONSET, NOT INTRACTABLE, WITHOUT STATUS EPILEPTICUS (HCC): ICD-10-CM

## 2021-10-21 ENCOUNTER — TELEPHONE (OUTPATIENT)
Dept: NEUROLOGY | Facility: CLINIC | Age: 31
End: 2021-10-21

## 2021-10-21 DIAGNOSIS — G40.009 PARTIAL IDIOPATHIC EPILEPSY WITH SEIZURES OF LOCALIZED ONSET, NOT INTRACTABLE, WITHOUT STATUS EPILEPTICUS (HCC): ICD-10-CM

## 2021-12-29 ENCOUNTER — TELEPHONE (OUTPATIENT)
Dept: NEUROLOGY | Facility: CLINIC | Age: 31
End: 2021-12-29

## 2022-01-05 ENCOUNTER — TELEPHONE (OUTPATIENT)
Dept: NEUROLOGY | Facility: CLINIC | Age: 32
End: 2022-01-05

## 2022-01-05 PROBLEM — G40.919 INTRACTABLE EPILEPSY (HCC): Status: ACTIVE | Noted: 2022-01-05

## 2022-01-05 PROBLEM — G40.909 NONINTRACTABLE EPILEPSY WITHOUT STATUS EPILEPTICUS (HCC): Status: ACTIVE | Noted: 2022-01-05

## 2022-01-05 PROBLEM — G40.909 NONINTRACTABLE EPILEPSY WITHOUT STATUS EPILEPTICUS (HCC): Status: RESOLVED | Noted: 2022-01-05 | Resolved: 2022-01-05

## 2022-01-05 NOTE — TELEPHONE ENCOUNTER
He is overdue for the valproate level ordered at last visit  Please confirm current divalproex formulation and dose as well as seizures since last visit  Encourage him to have blood work done  Thanks

## 2022-01-06 NOTE — TELEPHONE ENCOUNTER
Spoke to [facility]  Will be going for labs within the week, likely tomorrow  Patient is taking divalproex (depakote ER) 250mg tablets currently taking 3 tablets in the morning and 4 tablets in the evening  1  focal seizure 3 weeks ago, 1 major focal seizure today - more intense  Patient was still functioning but not functioning properly  Taking off slippers, walking randomly around  [employer] asleep for a bit after and now awake back to baseline

## 2022-04-07 ENCOUNTER — TELEPHONE (OUTPATIENT)
Dept: NEUROLOGY | Facility: CLINIC | Age: 32
End: 2022-04-07

## 2022-04-07 DIAGNOSIS — G40.919 INTRACTABLE EPILEPSY (HCC): ICD-10-CM

## 2022-04-07 RX ORDER — LACOSAMIDE 150 MG/1
TABLET ORAL
Qty: 120 TABLET | Refills: 5 | Status: SHIPPED | OUTPATIENT
Start: 2022-04-07 | End: 2022-07-27 | Stop reason: SDUPTHER

## 2022-04-07 RX ORDER — DIVALPROEX SODIUM 250 MG/1
TABLET, EXTENDED RELEASE ORAL
Qty: 210 TABLET | Refills: 5 | Status: SHIPPED | OUTPATIENT
Start: 2022-04-07 | End: 2022-07-27 | Stop reason: SDUPTHER

## 2022-04-07 NOTE — TELEPHONE ENCOUNTER
Mom called to notify vimpat requires  Per chart review, this was done in December and approved through 12/29/2022  Mom stated she was given a new key to submit PA  Key: [id number]    Call placed to [facility] - [phone]      Call placed to [employer]  [phone]  Patient has new carrier  Previous account termed  [id number]     [id number]    Patient also has medicare part D plan which is where previous PA was done  Pharmacy is billing under family commercial plan  Not covered by family plan  Medicare part D ID:  [id number]  Covered with $0 copay    Provided alternate ID information to pharmacy and mom

## 2022-04-07 NOTE — TELEPHONE ENCOUNTER
Patient's mother called to reschedule his appointment due to her  surgery      Reschedule her with Dr Sherice Kim in [place] on 7/27/22 at 1 am

## 2022-07-26 ENCOUNTER — TELEPHONE (OUTPATIENT)
Dept: NEUROLOGY | Facility: CLINIC | Age: 32
End: 2022-07-26

## 2022-07-26 NOTE — TELEPHONE ENCOUNTER
Patient's mother called to asked if we can do her son's virtual appointment via e-mail link because they can't get into his [facility]  I updated the appt notes and advised we will reach out and send her a link 10-15 mins before his appointment

## 2022-07-27 ENCOUNTER — TELEMEDICINE (OUTPATIENT)
Dept: NEUROLOGY | Facility: CLINIC | Age: 32
End: 2022-07-27
Payer: MEDICARE

## 2022-07-27 ENCOUNTER — TELEPHONE (OUTPATIENT)
Dept: NEUROLOGY | Facility: CLINIC | Age: 32
End: 2022-07-27

## 2022-07-27 DIAGNOSIS — G40.919 INTRACTABLE EPILEPSY (HCC): ICD-10-CM

## 2022-07-27 DIAGNOSIS — G40.219 PARTIAL SYMPTOMATIC EPILEPSY WITH COMPLEX PARTIAL SEIZURES, INTRACTABLE, WITHOUT STATUS EPILEPTICUS (HCC): Primary | ICD-10-CM

## 2022-07-27 PROCEDURE — 99214 OFFICE O/P EST MOD 30 MIN: CPT | Performed by: PSYCHIATRY & NEUROLOGY

## 2022-07-27 RX ORDER — PITAVASTATIN CALCIUM 2.09 MG/1
2 TABLET, FILM COATED ORAL DAILY
COMMUNITY
Start: 2022-07-05

## 2022-07-27 RX ORDER — LEVETIRACETAM 1000 MG/1
TABLET ORAL
Qty: 315 TABLET | Refills: 3 | Status: SHIPPED | OUTPATIENT
Start: 2022-07-27

## 2022-07-27 RX ORDER — LACOSAMIDE 150 MG/1
TABLET ORAL
Qty: 360 TABLET | Refills: 1 | Status: SHIPPED | OUTPATIENT
Start: 2022-07-27

## 2022-07-27 RX ORDER — DIVALPROEX SODIUM 250 MG/1
TABLET, EXTENDED RELEASE ORAL
Qty: 630 TABLET | Refills: 3 | Status: SHIPPED | OUTPATIENT
Start: 2022-07-27

## 2022-07-27 NOTE — PATIENT INSTRUCTIONS
Continue current seizure medications unchanged  Let us know if there are seizures or problems with your medication  Return in 4 months

## 2022-07-27 NOTE — PROGRESS NOTES
Virtual Regular Visit    Verification of patient location:    Patient is located in the following state in which I hold an active license [place]      Assessment/Plan:    Problem List Items Addressed This Visit        Nervous and Auditory    Intractable epilepsy ([facility] [address] ) - Primary    Relevant Medications    divalproex sodium (DEPAKOTE ER) 250 mg 24 hr tablet    levETIRAcetam (KEPPRA) 1000 MG tablet    lacosamide (Vimpat) 150 mg tablet    Other Relevant Orders    Lacosamide    Levetiracetam level    Valproic acid level, total    Comprehensive metabolic panel               Reason for visit is   Chief Complaint   Patient presents with    Virtual Regular Visit        Encounter provider Moustapha Altamirano MD    Provider located at [address]      Recent Visits  Date Type Provider Dept   07/26/22 Telephone [facility]   Showing recent visits within past 7 days and meeting all other requirements  Today's Visits  Date Type Provider Dept   07/27/22 Telephone Moustapha Altamirano MD [facility]   07/27/22 [facility] MD Soha Holbrook [facility] 104 today's visits and meeting all other requirements  Future Appointments  No visits were found meeting these conditions  Showing future appointments within next 150 days and meeting all other requirements       The patient was identified by name and date of birth  Belen Lowry was informed that this is a telemedicine visit and that the visit is being conducted through [facility] and patient was informed this is a secure, HIPAA-complaint platform  He agrees to proceed     My office door was closed  No one else was in the room  He acknowledged consent and understanding of privacy and security of the video platform  The patient has agreed to participate and understands they can discontinue the visit at any time      Patient is aware this is a billable service  [place] [address] [facility]  Follow Up Visit    Impression/Plan    Mr Peggy Guaman is a 34 y o  male with mild developmental delay / static encephalopathy presenting regarding epilepsy manifest as focal aware/impaired aware seizures (one per month) and secondary generalized tonic clonic seizures (controlled)  Generalized epilepsy is also possible  His neurological exam is normal      Seizures are somewhat improved after addition of divalproex in the EMU  Will change to divalproex ER  Can consider lower levetiracetam at next visit  There may be room to increase valproate now that he is on the ER formulation  Consider lamotrigine in the future  Patient Instructions   1  Continue current seizure medications unchanged  2  Let us know if there are seizures or problems with your medication  3  Return in 4 months  Diagnoses and all orders for this visit:    Partial symptomatic epilepsy with complex partial seizures, intractable, without status epilepticus ([facility] )  -     Lacosamide; Future  -     Levetiracetam level; Future  -     Valproic acid level, total; Future  -     Comprehensive metabolic panel; Future    Intractable epilepsy (HCC)  -     divalproex sodium (DEPAKOTE ER) 250 mg 24 hr tablet; Take 3 tablets (750 mg total) by mouth every morning AND 4 tablets (1,000 mg total) every evening   -     levETIRAcetam (KEPPRA) 1000 MG tablet; Take 1 5 tablets (1,500 mg total) by mouth every morning AND 2 tablets (2,000 mg total) daily at bedtime   -     lacosamide (Vimpat) 150 mg tablet; Take two tablets (300 mg) by mouth every 12 hours    Other orders  -     Livalo 2 MG; Take 2 mg by mouth in the morning        Neyda Nettles is returning to the [place] [address] [facility] for follow up       Interval Events:   Seizures since last visit: about one per month (focal impaired aware, improved from prior to divalproex)   Hospitalizations: no    Last seen 10/20/2021 (incorrectly labeled "erroneous encounter")  His mother is also on the call  Changed to divalproex ER at last visit  Was going to consider lower levetiracetam dose after establishing adequate level and stable dose of divalproex, but lab work was not done  There has been a lot going on with his father's health, he recently received a kidney transplant and follow up visits with us had to be canceled  There was a 20 second seizure on 7/4  Less than one per month since last visit  Moving to apartment with support systems  Planning to have a roommate  Seizures have not been more severe than usual  Arms go up and inhale, will count with his mother and say "i'm fine" over and over again (similar to seizures captured in EMU)  When he converses more mom knows it's over  Current AEDs:  Levetiracetam 1500 mg AM / 2000 mg PM  Lacosamide 300 mg bid  Divalproex  mg AM / 1000 mg PM  Medication side effects: None  Medication adherence: Yes    Event/Seizure semiology:  · Focal impaired aware seizures  Relative behavior arrest, mild facial expression change, restless movements, extending legs, touching face/head with right arm and possible expressive aphasia (capture during 6/2021 EMU admission, diffuse bihemispheric eeg change)  · Generalized tonic clonic seizure (currently controlled)     Special Features  Status epilepticus: no  Self Injury Seizures: unknown  Precipitating Factors: unknown     Epilepsy Risk Factors:  Full term, normal birth  Early intervention at about 18 months  Went to [facility]  Given diagnosis of mild/moderate MR  Walked and talked late  Talked at about 1 yo  2 years behind in school  Inclusion sometimes  Life skills program in middle and high school  Stopped school at 23  Works 3 days, 4 hours each  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   Obtained eagle        Prior AEDs:  Phenytoin     Prior Evaluation:  Sedated MRI brain done in 5/2019  Study was normal    All EEGs have been normal       05/21/2020 EEG awake and asleep (Dr Paola Deluca): Intermittent brief bursts of diffuse, right hemisphere dominant, 3-4 cps slowing as well as similar slowing isolated to the right hemisphere and maximal over the temporal region suggest underlying neuronal dysfunction with possible focal neuronal dysfunction in the right temporal region  Some of the bursts contain poorly formed, poorly localized, sharp components that are of uncertain significance       24 hour AEEG 6/30/2020:  Sporadic right temporal delta activities suggests an underlying area of neuronal dysfunction  The frequent runs of diffuse rhythmic theta activities that were maximal in the right temporal region are of unclear significance  These were typically brief and without clear evolution  Some features (diffuse distribution, abrupt start/stop, and on one occasion, increase in frequency over course of burst) would be suggestive of SREDA, however, patients age and short duration of this activity would be atypical for this variant  Overall, these are favored to be a benign variant, but the possibility of brief ictal rhythmic discharges cannot fully be excluded  A run of bilateral frontal delta activities is favored to represent artifact       EMU admission 6/2021:   Summary interictal findings:   Generalized sharp/spike and slow wave discharges as well as brief potentially ictal rhythmic discharges (BIRDs) occurring during drowsiness and sleep suggest underlying epileptogenic potential  Intermittent diffuse and mild independent right more than left polymorphic theta/delta activities may represent fragments of generalized discharges/slowing    Summary event findings:   There were 4 brief electroclinical seizures (2 clusters of 2 seizures each) with diffuse bi hemispheric ictal rhyhtmic theta/alpha activity  The events were identified by the patient's mother as mild typical events   The seizures involve relative behavior arrest, mild facial expression change, restless movements, extending legs, touching face/head with right arm and possible expressive aphasia  The patient repeated "I'm fine" in response to prompts from his mother as the events ended  Seizure Classification:   Focal impaired aware (captured during this admission)  Generalized tonic clonic seizure (not captured during this admission, currently controlled)  Epilepsy Classification: Focal epilepsy with focal impaired aware seizures and bilateral tonic clonic seizures out of sleep versus generalized epilepsy    EMU findings and discussion:  Patient was admitted to the EMU for spell characterization  Since his events were occurring about 3 times per week at home, no medication weaning or sleep deprivation was required  He had a total of 4 brief typical seizures which occurred on day 1 (2 seizures) and day 2 (2 seizure) of admission  Since his seizures were not controlled on his home AED regimen of lacosamide 300 mg BID and levetiracetam 1500 mg in the morning and 2000 mg at night, an additional AED was added  AEDs were reviewed with patient and mother and ultimately divalproex was added at 1000 mg twice per day  Plan to continue this new medication at home and consider decreasing or stopping lacosamide at follow up if warranted  Recommend valproic acid level, CBC, and CMP in one week after discharge          History Reviewed: The following were reviewed and updated as appropriate: allergies, current medications, past medical history, past social history, and problem list     Psychiatric History:  None     Social History:   Driving: No  Lives Alone:no  Occupation: Works 4 days, 4 hours each      Objective    There were no vitals taken for this visit  General Exam  No acute distress  Neurologic Exam  Mental Status:  Alert  Participates in conversation, often by asking mom questions about specific details she is reporting     Language: normal fluency and comprehension  Review of Systems   Constitutional: Negative  Negative for appetite change and fever  HENT: Negative  Negative for hearing loss, tinnitus, trouble swallowing and voice change  Eyes: Negative  Negative for photophobia and pain  Respiratory: Negative  Negative for shortness of breath  Cardiovascular: Negative  Negative for palpitations  Gastrointestinal: Negative  Negative for nausea and vomiting  Endocrine: Negative  Negative for cold intolerance  Genitourinary: Negative  Negative for dysuria, frequency and urgency  Musculoskeletal: Negative  Negative for myalgias and neck pain  Skin: Negative  Negative for rash  Neurological: Positive for seizures  Negative for dizziness, tremors, syncope, facial asymmetry, speech difficulty, weakness, light-headedness, numbness and headaches  Hematological: Negative  Does not bruise/bleed easily  Psychiatric/Behavioral: Negative  Negative for confusion, hallucinations and sleep disturbance  ROS reviewed and updated as appropriate  I spent 15 minutes directly with the patient during this visit    VIRTUAL VISIT DISCLAIMER      Monty Alfred verbally agrees to participate in [employer]  Pt is aware that [employer] could be limited without vital signs or the ability to perform a full hands-on physical Roxane Herr understands he or the provider may request at any time to terminate the video visit and request the patient to seek care or treatment in person

## 2022-09-08 ENCOUNTER — TELEMEDICINE (OUTPATIENT)
Dept: FAMILY MEDICINE CLINIC | Facility: CLINIC | Age: 32
End: 2022-09-08
Payer: MEDICARE

## 2022-09-08 ENCOUNTER — TELEPHONE (OUTPATIENT)
Dept: NEUROLOGY | Facility: CLINIC | Age: 32
End: 2022-09-08

## 2022-09-08 DIAGNOSIS — G40.919 INTRACTABLE EPILEPSY WITHOUT STATUS EPILEPTICUS, UNSPECIFIED EPILEPSY TYPE (HCC): ICD-10-CM

## 2022-09-08 DIAGNOSIS — U07.1 COVID-19 VIRUS INFECTION: Primary | ICD-10-CM

## 2022-09-08 PROCEDURE — 99204 OFFICE O/P NEW MOD 45 MIN: CPT | Performed by: NURSE PRACTITIONER

## 2022-09-08 RX ORDER — ALBUTEROL SULFATE 90 UG/1
3 AEROSOL, METERED RESPIRATORY (INHALATION) ONCE AS NEEDED
Status: CANCELLED | OUTPATIENT
Start: 2022-09-10

## 2022-09-08 RX ORDER — SODIUM CHLORIDE 9 MG/ML
20 INJECTION, SOLUTION INTRAVENOUS ONCE
Status: CANCELLED | OUTPATIENT
Start: 2022-09-10

## 2022-09-08 RX ORDER — ACETAMINOPHEN 325 MG/1
650 TABLET ORAL ONCE AS NEEDED
Status: CANCELLED | OUTPATIENT
Start: 2022-09-10

## 2022-09-08 RX ORDER — ONDANSETRON 2 MG/ML
4 INJECTION INTRAMUSCULAR; INTRAVENOUS ONCE AS NEEDED
Status: CANCELLED | OUTPATIENT
Start: 2022-09-10

## 2022-09-08 RX ORDER — BEBTELOVIMAB 87.5 MG/ML
175 INJECTION, SOLUTION INTRAVENOUS ONCE
Status: CANCELLED | OUTPATIENT
Start: 2022-09-10

## 2022-09-08 NOTE — TELEPHONE ENCOUNTER
Mom called to notify patient has tested positive for COVID  Asking if our office would order monoclonal antibodies for patient  Call returned to mom  Advised our office does not prescribe monoclonal antibodies

## 2022-09-08 NOTE — ASSESSMENT & PLAN NOTE
Monoclonal antibody infusion ordered  Patient's parents were advised to make the office aware if the patient begins to have any worsening symptoms  I will follow-up with patient again on 09/12/2022

## 2022-09-08 NOTE — PATIENT INSTRUCTIONS
Problem List Items Addressed This Visit          Nervous and Auditory    Intractable epilepsy [facility])     Patient is currently managed on Keppra and Depakote  Other    COVID-19 virus infection - Primary     Monoclonal antibody infusion ordered  Patient's parents were advised to make the office aware if the patient begins to have any worsening symptoms  I will follow-up with patient again on 09/12/2022  [address]    Your healthcare provider and/or public health staff have evaluated you and have determined that you do not need to remain in the hospital at this time  At this time you can be isolated at home where you will be monitored by staff from your local or state health department  You should carefully follow the prevention and isolation steps below until a healthcare provider or local or state health department says that you can return to your normal activities  Stay home except to get medical care    People who are mildly ill with COVID-19 are able to isolate at home during their illness  You should restrict activities outside your home, except for getting medical care  Do not go to work, school, or public areas  Avoid using public transportation, ride-sharing, or taxis  Separate yourself from other people and animals in your home    People: As much as possible, you should stay in a specific room and away from other people in your home  Also, you should use a separate bathroom, if available  Animals: You should restrict contact with pets and other animals while you are sick with COVID-19, just like you would around other people  Although there have not been reports of pets or other animals becoming sick with COVID-19, it is still recommended that people sick with COVID-19 limit contact with animals until more information is known about the virus  When possible, have another member of your household care for your animals while you are sick   If you are sick with COVID-19, avoid contact with your pet, including petting, snuggling, being kissed or licked, and sharing food  If you must care for your pet or be around animals while you are sick, wash your hands before and after you interact with pets and wear a facemask  See COVID-19 and Animals for more information  Call ahead before visiting your doctor    If you have a medical appointment, call the healthcare provider and tell them that you have or may have COVID-19  This will help the healthcare providers office take steps to keep other people from getting infected or exposed  Wear a facemask    You should wear a facemask when you are around other people (e g , sharing a room or vehicle) or pets and before you enter a healthcare providers office  If you are not able to wear a facemask (for example, because it causes trouble breathing), then people who live with you should not stay in the same room with you, or they should wear a facemask if they enter your room  Cover your coughs and sneezes    Cover your mouth and nose with a tissue when you cough or sneeze  Throw used tissues in a lined trash can  Immediately wash your hands with soap and water for at least 20 seconds or, if soap and water are not available, clean your hands with an alcohol-based hand  that contains at least 60% alcohol  Clean your hands often    Wash your hands often with soap and water for at least 20 seconds, especially after blowing your nose, coughing, or sneezing; going to the bathroom; and before eating or preparing food  If soap and water are not readily available, use an alcohol-based hand  with at least 60% alcohol, covering all surfaces of your hands and rubbing them together until they feel dry  Soap and water are the best option if hands are visibly dirty  Avoid touching your eyes, nose, and mouth with unwashed hands      Avoid sharing personal household items    You should not share dishes, drinking glasses, cups, eating utensils, towels, or bedding with other people or pets in your home  After using these items, they should be washed thoroughly with soap and water  Clean all high-touch surfaces everyday    High touch surfaces include counters, tabletops, doorknobs, bathroom fixtures, toilets, phones, keyboards, tablets, and bedside tables  Also, clean any surfaces that may have blood, stool, or body fluids on them  Use a household cleaning spray or wipe, according to the label instructions  Labels contain instructions for safe and effective use of the cleaning product including precautions you should take when applying the product, such as wearing gloves and making sure you have good ventilation during use of the product  Monitor your symptoms    Seek prompt medical attention if your illness is worsening (e g , difficulty breathing)  Before seeking care, call your healthcare provider and tell them that you have, or are being evaluated for, COVID-19  Put on a facemask before you enter the facility  These steps will help the healthcare providers office to keep other people in the office or waiting room from getting infected or exposed  Ask your healthcare provider to call the local or [facility] health department  Persons who are placed under active monitoring or facilitated self-monitoring should follow instructions provided by their local health department or occupational health professionals, as appropriate  If you have a medical emergency and need to call 911, notify the dispatch personnel that you have, or are being evaluated for COVID-19  If possible, put on a facemask before emergency medical services arrive  Discontinuing home isolation    Patients with confirmed COVID-19 should remain under home isolation precautions until the following conditions are met:    They have had no fever for at least 24 hours (that is one full day of no fever without the use medicine that reduces fevers)  AND  other symptoms have improved (for example, when their cough or shortness of breath have improved)  AND  If had mild or moderate illness, at least 10 days have passed since their symptoms first appeared or if severe illness (needed oxygen) or immunosuppressed, at least 20 days have passed since symptoms first appeared  Patients with confirmed COVID-19 should also notify close contacts (including their workplace) and ask that they self-quarantine  Currently, close contact is defined as being within 6 feet for 15 minutes or more from the period 24 hours starting 48 hours before symptom onset to the time at which the patient went into isolation  Close contacts of patients diagnosed with COVID-19 should be instructed by the patient to self-quarantine for 14 days from the last time of their last contact with the patient       Source: [employer]

## 2022-09-08 NOTE — PROGRESS NOTES
COVID-19 Outpatient Progress Note    Assessment/Plan:    Problem List Items Addressed This Visit        Nervous and Auditory    Intractable epilepsy [facility])     Patient is currently managed on Keppra and Depakote  Other    COVID-19 virus infection - Primary     Monoclonal antibody infusion ordered  Patient's parents were advised to make the office aware if the patient begins to have any worsening symptoms  I will follow-up with patient again on 09/12/2022  Disposition:     Discussed symptom directed medication options with patient  Patient already tested positive for COVID  Patient meets criteria for Bebtelovimab infusion  They were counseled in regards to risks, benefits, and side effects of this infusion  Madelon Silvan is an investigational medicine used to treat mild-to-moderate symptoms of COVID-19 in adults and children (15years of age and older weighing at least 80 pounds (40 kg)) with positive results of direct SARS-CoV-2 viral testing, and who are at high risk of progression to severe COVID-19, including hospitalization or death, and for whom other COVID-19 treatment options approved or authorized by FDA are not available or clinically appropriate  Bebtelovimab is investigational because it is still being studied  There is limited information about the safety and effectiveness of using bebtelovimab to treat people with mild-to-moderate COVID-19  The FDA has authorized the emergency use of bebtelovimab for the treatment of COVID-19 under an Emergency Use Authorization (EUA)       Madelon Silvan is not authorized for use in people who:  - are likely to be infected with a SARS-CoV-2 variant that is not able to be treated by bebtelovimab based on the circulating variants in your area (ask your health care provider about FDA and CDCs latest information on circulating variants by geographic area), or  - are hospitalized due to COVID-19, or  - require oxygen therapy and/or respiratory support due to COVID-19, or  - require an increase in baseline oxygen flow rate and/or respiratory support due to COVID19 and are on chronic oxygen therapy and/or respiratory support due to underlying nonCOVID-19 related comorbidity  How will I receive Bebtelovimab? Ashish Crumble will be given as an injection through a vein (intravenously or IV) over at least 30 seconds  You will be observed by your healthcare provider for at least 1 hour after you receive bebtelovimab  Possible side effects of Bebtelovimab: Allergic reactions can happen during and after infusion with bebtelovimab  Possible reactions include: fever, chills, nausea, headache, shortness of breath, low or high blood pressure, rapid or slow heart rate, chest discomfort or pain, weakness, confusion, feeling tired, wheezing, swelling of your lips, face, or throat, rash including hives, itching, muscle aches, dizziness, and sweating  These reactions may be severe or life threatening  Worsening symptoms after treatment: You may experience new or worsening symptoms after infusion, including fever, difficulty breathing, rapid or slow heart rate, tiredness, weakness or confusion  If these occur, contact your healthcare provider or seek immediate medical attention as some of these events have required hospitalization  It is unknown if these events are related to treatment or are due to the progression of COVID19  The side effects of getting any medicine by vein may include brief pain, bleeding, bruising of the skin, soreness, swelling, and possible infection at the infusion site  These are not all the possible side effects of bebtelovimab  Not a lot of people have been given bebtelovimab  Serious and unexpected side effects may happen  Bebtelovimab are still being studied so it is possible that all of the risks are not known at this time       It is possible that bebtelovimab could interfere with your body's own ability to fight off a future infection of SARS-CoV-2  Similarly, bebtelovimab may reduce your bodys immune response to a vaccine for SARS-CoV-2  Specific studies have not been conducted to address these possible risks  Talk to your healthcare provider if you have any questions  Emergency Use Authorization:    The [facility] FDA has made bebtelovimab available under an emergency access mechanism called an EUA  The EUA is supported by a  of Health and Human Service ([facility]) declaration that circumstances exist to justify the emergency use of drugs and biological products during the COVID-19 pandemic  Bebtelovimab have not undergone the same type of review as an FDA-approved or cleared product  The FDA may issue an EUA when certain criteria are met, which includes that there are no adequate, approved, and available alternatives  In addition, the FDA decision is based on the totality of scientific evidence available showing that it is reasonable to believe that the product meets certain criteria for safety, performance, and labeling and may be effective in treatment of patients during the COVID-19 pandemic  All of these criteria must be met to allow for the product to be used in the treatment of patients during the COVID-19 pandemic  The EUA for bebtelovimab together is in effect for the duration of the COVID-19 declaration justifying emergency use of these products, unless terminated or revoked (after which the product may no longer be used)  What if I am pregnant or breastfeeding? There is no experience treating pregnant women or breastfeeding mothers with bebtelovimab  For a mother and unborn baby, the benefit of receiving bebtelovimab may be greater than the risk from the treatment  If you are pregnant or breastfeeding, discuss your options and specific situation with your healthcare provider  How do I report side effects with Bebtelovimab?   Contact your healthcare provider if you have any side effects that bother you or do not go away  Report side effects to FDA MedWatch at www fda gov/medwatch, or call 0-366-JDG-7258 or to [address]  as shown below  Email: Tank@[employer]  com   Fax number: [phone]   Telephone number: 1-047-SUZTOU87 ([phone])    Full fact sheet document for patients can be found at: Dara burnett    The patient consents to proceed with bebtelovimab infusion  I have spent 15 minutes directly with the patient  Encounter provider: GABRIELLE Gonzalez     Provider located at: [address] [address] [phone]     Recent Visits  No visits were found meeting these conditions  Showing recent visits within past 7 days and meeting all other requirements  Today's Visits  Date Type Provider Dept   09/08/22 Telemedicine Fatmata Lopez [facility]   Showing today's visits and meeting all other requirements  Future Appointments  No visits were found meeting these conditions  Showing future appointments within next 150 days and meeting all other requirements     This virtual check-in was done via [employer] and patient was informed that this is a secure, HIPAA-compliant platform  He agrees to proceed  Patient agrees to participate in a virtual check in via telephone or video visit instead of presenting to the office to address urgent/immediate medical needs  Patient is aware this is a billable service  He acknowledged consent and understanding of privacy and security of the video platform  The patient has agreed to participate and understands they can discontinue the visit at any time  After connecting through [facility], the patient was identified by name and date of birth  Felixwilfredo Montelongo was informed that this was a telemedicine visit and that the exam was being conducted confidentially over secure lines   My office door was closed  No one else was in the room  Valdez Hartmann acknowledged consent and understanding of privacy and security of the telemedicine visit  I informed the patient that I have reviewed his record in Epic and presented the opportunity for him to ask any questions regarding the visit today  The patient agreed to participate  Verification of patient location:  Patient is located in the following state in which I hold an active license: [place]    Subjective:   Valdez Hartmann is a 34 y o  male who is concerned about COVID-19  Patient's symptoms include fatigue, malaise, nasal congestion and rhinorrhea  Patient denies fever, chills, sore throat, anosmia, loss of taste, cough, shortness of breath, chest tightness, abdominal pain, nausea, vomiting, diarrhea, myalgias and headaches  - Date of symptom onset: 9/5/2022      COVID-19 vaccination status: Fully vaccinated (primary series)    Exposure:   Contact with a person who is under investigation (PUI) for or who is positive for COVID-19 within the last 14 days?: Yes    Hospitalized recently for fever and/or lower respiratory symptoms?: No      Currently a healthcare worker that is involved in direct patient care?: No      Works in a special setting where the risk of COVID-19 transmission may be high? (this may include long-term care, correctional and skilled nursing facilities; homeless shelters; assisted-living facilities and group homes ): No      Resident in a special setting where the risk of COVID-19 transmission may be high? (this may include long-term care, correctional and skilled nursing facilities; homeless shelters; assisted-living facilities and group homes ): No      Patient tested positive for [place] via home test on 9/6/22  Patient is reporting symptoms of loss of appetite, rhinorrhea, nasal congestion, and increased fatigue  Patient denies any fevers or shortness of breath  The patient is fully vaccinated against COVID    The patient is not a candidate for Paxlovid due to the anti epileptic medications that he is currently taking however, he would qualify for Molnupiravir  The patient is intellectually disabled so after speaking with his parents about this they insisted that the monoclonal antibody infusion be ordered for the patient  I made the patient's parents aware that they may be billed for the monoclonal antibody infusion as this is no longer completely covered by insurance  The patient's parents still wanted the infusion to be ordered  I spoke with them about the possible side effects of the infusion  No results found for: SARSCOV2, [address] [facility], SARSCORONAVI, CORONAVIRUSR, SARSCOVAG, [facility]  Past Medical History:   Diagnosis Date    Hyperlipidemia      Past Surgical History:   Procedure Laterality Date    NO PAST SURGERIES       Current Outpatient Medications   Medication Sig Dispense Refill    Ascorbic Acid (VITAMIN C) 1000 MG tablet Take 3,000 mg by mouth daily (Patient not taking: No sig reported)      Cholecalciferol (VITAMIN D3 PO) Take 2,000 Units by mouth daily      divalproex sodium (DEPAKOTE ER) 250 mg 24 hr tablet Take 3 tablets (750 mg total) by mouth every morning AND 4 tablets (1,000 mg total) every evening  630 tablet 3    Krill Oil 1000 MG CAPS Take 2 capsules by mouth daily       lacosamide (Vimpat) 150 mg tablet Take two tablets (300 mg) by mouth every 12 hours 360 tablet 1    levETIRAcetam (KEPPRA) 1000 MG tablet Take 1 5 tablets (1,500 mg total) by mouth every morning AND 2 tablets (2,000 mg total) daily at bedtime  315 tablet 3    Livalo 2 MG Take 2 mg by mouth in the morning       No current facility-administered medications for this visit  No Known Allergies    Review of Systems   Constitutional: Positive for appetite change (loss of appetite ) and fatigue  Negative for chills and fever  HENT: Positive for congestion and rhinorrhea  Negative for sore throat  Eyes: Negative      Respiratory: Negative for cough, chest tightness and shortness of breath  Cardiovascular: Negative  Gastrointestinal: Negative for abdominal pain, diarrhea, nausea and vomiting  Endocrine: Negative  Genitourinary: Negative  Musculoskeletal: Negative for myalgias  Skin: Negative  Allergic/Immunologic: Negative  Neurological: Negative for headaches  Hematological: Negative  Psychiatric/Behavioral: Negative  Objective: There were no vitals filed for this visit  Physical Exam  Vitals reviewed: limited due to [employer] exam    Constitutional:       General: He is not in acute distress  Appearance: Normal appearance  He is not ill-appearing  Neurological:      Mental Status: He is alert

## 2022-09-10 ENCOUNTER — HOSPITAL ENCOUNTER (OUTPATIENT)
Dept: INFUSION CENTER | Facility: HOSPITAL | Age: 32
Discharge: HOME | End: 2022-09-10
Payer: MEDICARE

## 2022-09-10 VITALS
RESPIRATION RATE: 18 BRPM | HEART RATE: 58 BPM | SYSTOLIC BLOOD PRESSURE: 112 MMHG | DIASTOLIC BLOOD PRESSURE: 60 MMHG | TEMPERATURE: 97.3 F | OXYGEN SATURATION: 95 %

## 2022-09-10 DIAGNOSIS — U07.1 COVID-19 VIRUS INFECTION: Primary | ICD-10-CM

## 2022-09-10 PROCEDURE — M0222 HB BEBTELOVIMAB INJECTION: HCPCS | Performed by: FAMILY MEDICINE

## 2022-09-10 RX ORDER — ACETAMINOPHEN 325 MG/1
650 TABLET ORAL ONCE AS NEEDED
Status: CANCELLED | OUTPATIENT
Start: 2022-09-10

## 2022-09-10 RX ORDER — ONDANSETRON 2 MG/ML
4 INJECTION INTRAMUSCULAR; INTRAVENOUS ONCE AS NEEDED
Status: CANCELLED | OUTPATIENT
Start: 2022-09-10

## 2022-09-10 RX ORDER — ONDANSETRON 2 MG/ML
4 INJECTION INTRAMUSCULAR; INTRAVENOUS ONCE AS NEEDED
Status: DISCONTINUED | OUTPATIENT
Start: 2022-09-10 | End: 2022-09-13 | Stop reason: HOSPADM

## 2022-09-10 RX ORDER — SODIUM CHLORIDE 9 MG/ML
20 INJECTION, SOLUTION INTRAVENOUS ONCE
Status: CANCELLED | OUTPATIENT
Start: 2022-09-10

## 2022-09-10 RX ORDER — ACETAMINOPHEN 325 MG/1
650 TABLET ORAL ONCE AS NEEDED
Status: DISCONTINUED | OUTPATIENT
Start: 2022-09-10 | End: 2022-09-13 | Stop reason: HOSPADM

## 2022-09-10 RX ORDER — BEBTELOVIMAB 87.5 MG/ML
175 INJECTION, SOLUTION INTRAVENOUS ONCE
Status: CANCELLED | OUTPATIENT
Start: 2022-09-10

## 2022-09-10 RX ORDER — SODIUM CHLORIDE 9 MG/ML
20 INJECTION, SOLUTION INTRAVENOUS ONCE
Status: DISCONTINUED | OUTPATIENT
Start: 2022-09-10 | End: 2022-09-13 | Stop reason: HOSPADM

## 2022-09-10 RX ORDER — ALBUTEROL SULFATE 90 UG/1
3 AEROSOL, METERED RESPIRATORY (INHALATION) ONCE AS NEEDED
Status: CANCELLED | OUTPATIENT
Start: 2022-09-10

## 2022-09-10 RX ORDER — ALBUTEROL SULFATE 90 UG/1
3 AEROSOL, METERED RESPIRATORY (INHALATION) ONCE AS NEEDED
Status: DISCONTINUED | OUTPATIENT
Start: 2022-09-10 | End: 2022-09-13 | Stop reason: HOSPADM

## 2022-09-10 RX ORDER — BEBTELOVIMAB 87.5 MG/ML
175 INJECTION, SOLUTION INTRAVENOUS ONCE
Status: COMPLETED | OUTPATIENT
Start: 2022-09-10 | End: 2022-09-10

## 2022-09-10 RX ADMIN — BEBTELOVIMAB 175 MG: 87.5 INJECTION, SOLUTION INTRAVENOUS at 08:30

## 2022-09-10 NOTE — NURSING NOTE
Pt here for Bebtelovimab  Pt gives verbal consent to proceed with treatment  IV started with no issue  Vital signs stable  Pt resting comfortably and has no further questions or concerns  Pt in view of RN at all times

## 2022-09-12 ENCOUNTER — TELEMEDICINE (OUTPATIENT)
Dept: FAMILY MEDICINE CLINIC | Facility: CLINIC | Age: 32
End: 2022-09-12
Payer: MEDICARE

## 2022-09-12 DIAGNOSIS — U07.1 COVID-19: Primary | ICD-10-CM

## 2022-09-12 PROCEDURE — 99213 OFFICE O/P EST LOW 20 MIN: CPT | Performed by: PHYSICIAN ASSISTANT

## 2022-09-12 RX ORDER — BENZONATATE 200 MG/1
200 CAPSULE ORAL 3 TIMES DAILY PRN
Qty: 30 CAPSULE | Refills: 0 | Status: SHIPPED | OUTPATIENT
Start: 2022-09-12 | End: 2022-09-22

## 2022-09-12 NOTE — PROGRESS NOTES
COVID-19 Outpatient Progress Note    Assessment/Plan:    Problem List Items Addressed This Visit    None     Visit Diagnoses     COVID-19    -  Primary    Relevant Medications    benzonatate (TESSALON) 200 MG capsule         Disposition:     Assessment/plan:  1  COVID-19 infection- patient is currently on day 7 of illness but seems to be slightly better since his IV infusion with monoclonal antibody therapy 2 days ago  He is not having any persistent fever any longer  He still has significant fatigue and cough however  I will send in [address] to be used every 8 hours for his cough  He may continue DayQuil and NyQuil  His pulse ox has been measuring between 95 and 97% at home  He is seen resting comfortably on the couch  I would encourage continued increase in appetite, light activity, walking around the house as tolerated  I will follow-up with him on Thursday but they are encouraged to call the office if symptoms would worsen sooner  I have spent 15 minutes directly with the patient  Encounter provider: Zoë Villatoro PA-C     Provider located at: [address] [address] [phone]     Recent Visits  Date Type Provider Dept   09/08/22 Telemedicine Fatmata [facility]   Showing recent visits within past 7 days and meeting all other requirements  Today's Visits  Date Type Provider Dept   09/12/22 Telemedicine Zoë Villatoro PA-C [facility]   Showing today's visits and meeting all other requirements  Future Appointments  No visits were found meeting these conditions  Showing future appointments within next 150 days and meeting all other requirements     This virtual check-in was done via [employer] and patient was informed that this is a secure, HIPAA-compliant platform  He agrees to proceed      Patient agrees to participate in a virtual check in via telephone or video visit instead of presenting to the office to address urgent/immediate medical needs  Patient is aware this is a billable service  He acknowledged consent and understanding of privacy and security of the video platform  The patient has agreed to participate and understands they can discontinue the visit at any time  After connecting through [facility], the patient was identified by name and date of birth  Meena Luna was informed that this was a telemedicine visit and that the exam was being conducted confidentially over secure lines  Meena Luna acknowledged consent and understanding of privacy and security of the telemedicine visit  I informed the patient that I have reviewed his record in Epic and presented the opportunity for him to ask any questions regarding the visit today  The patient agreed to participate  Verification of patient location:  Patient is located in the following state in which I hold an active license: [place]    Subjective:   Meena Luna is a 34 y o  male who has been screened for COVID-19  Patient's symptoms include fatigue, malaise, nasal congestion, rhinorrhea and cough  Patient denies fever, chills, shortness of breath, chest tightness, abdominal pain, nausea, vomiting and diarrhea  - Date of symptom onset: 9/5/2022      COVID-19 vaccination status: Fully vaccinated with booster     HPI:  This is a 18-year-old gentleman that presents  Via virtual video visit using [employer] platform  He is seen with his parents serving as primary historians as he has a history of mild intellectual disability  He started with symptoms of COVID last Monday the 7th of September and when for IV infusion therapy with monoclonal antibodies on Saturday the 10th  He does seem to be doing a little bit better without fever since then but he is still very fatigued and having quite a cough  He is taking NyQuil in the evening and DayQuil during the daytime with a little benefit    He does not seem to have any wheezing audible E from the chest or symptoms of chest tightness  His parents have been able to measure his pulse ox some or between 95 and 97% typically  No results found for: SARSCOV2, [address] [facility], SARSCORONAVI, CORONAVIRUSR, SARSCOVAG, [facility]  Past Medical History:   Diagnosis Date    Hyperlipidemia      Past Surgical History:   Procedure Laterality Date    NO PAST SURGERIES       Current Outpatient Medications   Medication Sig Dispense Refill    benzonatate (TESSALON) 200 MG capsule Take 1 capsule (200 mg total) by mouth 3 (three) times a day as needed for cough for up to 10 days 30 capsule 0    Ascorbic Acid (VITAMIN C) 1000 MG tablet Take 3,000 mg by mouth daily (Patient not taking: No sig reported)      Cholecalciferol (VITAMIN D3 PO) Take 2,000 Units by mouth daily      divalproex sodium (DEPAKOTE ER) 250 mg 24 hr tablet Take 3 tablets (750 mg total) by mouth every morning AND 4 tablets (1,000 mg total) every evening  630 tablet 3    Krill Oil 1000 MG CAPS Take 2 capsules by mouth daily       lacosamide (Vimpat) 150 mg tablet Take two tablets (300 mg) by mouth every 12 hours 360 tablet 1    levETIRAcetam (KEPPRA) 1000 MG tablet Take 1 5 tablets (1,500 mg total) by mouth every morning AND 2 tablets (2,000 mg total) daily at bedtime  315 tablet 3    Livalo 2 MG Take 2 mg by mouth in the morning       No current facility-administered medications for this visit       Facility-Administered Medications Ordered in Other Visits   Medication Dose Route Frequency Provider Last Rate Last Admin    acetaminophen (TYLENOL) tablet 650 mg  650 mg Oral Once PRN Binu Christianson MD        albuterol (PROVENTIL HFA,VENTOLIN HFA) inhaler 3 puff  3 puff Inhalation Once PRN Binu Christianson MD        ondansetron [facility]) injection 4 mg  4 mg Intravenous Once PRN Binu Christianson MD        sodium chloride 0 9 % infusion  20 mL/hr Intravenous Once Binu Christianson MD         No Known Allergies    Review of Systems Constitutional: Positive for fatigue  Negative for chills and fever  HENT: Positive for congestion and rhinorrhea  Respiratory: Positive for cough  Negative for chest tightness and shortness of breath  Gastrointestinal: Negative for abdominal pain, diarrhea, nausea and vomiting  Objective: There were no vitals filed for this visit  Physical Exam  Constitutional:       General: He is not in acute distress  Appearance: He is well-developed  HENT:      Head: Normocephalic and atraumatic  Eyes:      Conjunctiva/sclera: Conjunctivae normal    Pulmonary:      Effort: Pulmonary effort is normal    Musculoskeletal:      Cervical back: Normal range of motion  Skin:     Findings: No rash  Neurological:      Mental Status: He is alert and oriented to person, place, and time

## 2022-09-13 ENCOUNTER — RA CDI HCC (OUTPATIENT)
Dept: OTHER | Facility: HOSPITAL | Age: 32
End: 2022-09-13

## 2022-09-13 NOTE — PROGRESS NOTES
[address]  coding opportunities       Chart reviewed, no opportunity found: CHART REVIEWED, NO OPPORTUNITY FOUND        Patients Insurance     Medicare Insurance: Medicare

## 2022-09-15 ENCOUNTER — TELEMEDICINE (OUTPATIENT)
Dept: FAMILY MEDICINE CLINIC | Facility: CLINIC | Age: 32
End: 2022-09-15
Payer: MEDICARE

## 2022-09-15 DIAGNOSIS — U07.1 COVID-19: Primary | ICD-10-CM

## 2022-09-15 PROCEDURE — 99213 OFFICE O/P EST LOW 20 MIN: CPT | Performed by: PHYSICIAN ASSISTANT

## 2022-09-15 NOTE — PROGRESS NOTES
COVID-19 Outpatient Progress Note    Assessment/Plan:    Problem List Items Addressed This Visit    None     Visit Diagnoses     COVID-19    -  Primary         Disposition:     Assessment/plan:  1  COVID-19 infection-status post monoclonal antibody infusion therapy  -patient seems to be improving gradually  His pulse ox levels have been staying above 97%  He is not having any shortness of breath or trouble getting around the house  His energy level seems to be improving gradually as well as his appetite returning  At this point he may return to work on Monday if he is feeling up to it with a mask for the next 5 days  If symptoms worsen recommend contacting the office and we will follow up as necessary  I have spent 15 minutes directly with the patient  Encounter provider: Ale Steele PA-C     Provider located at: [address] [address] [phone]     Recent Visits  Date Type Provider Dept   09/12/22 Telemedicine Ale Steele PA-C [facility]   09/08/22 Telemedicine Fatmata Shields [facility]   Showing recent visits within past 7 days and meeting all other requirements  Today's Visits  Date Type Provider Dept   09/15/22 Telemedicine Ale Steele PA-C [facility]   Showing today's visits and meeting all other requirements  Future Appointments  No visits were found meeting these conditions  Showing future appointments within next 150 days and meeting all other requirements     This virtual check-in was done via [employer] and patient was informed that this is a secure, HIPAA-compliant platform  He agrees to proceed  Patient agrees to participate in a virtual check in via telephone or video visit instead of presenting to the office to address urgent/immediate medical needs  Patient is aware this is a billable service   He acknowledged consent and understanding of privacy and security of the video platform  The patient has agreed to participate and understands they can discontinue the visit at any time  After connecting through [facility], the patient was identified by name and date of birth  Marcela Sharp was informed that this was a telemedicine visit and that the exam was being conducted confidentially over secure lines  Marcela Sharp acknowledged consent and understanding of privacy and security of the telemedicine visit  I informed the patient that I have reviewed his record in Epic and presented the opportunity for him to ask any questions regarding the visit today  The patient agreed to participate  Verification of patient location:  Patient is located in the following state in which I hold an active license: [place]    Subjective:   Marcela Sharp is a 34 y o  male who has been screened for COVID-19  Patient's symptoms include fatigue, malaise and cough  Patient denies fever, chills, congestion, rhinorrhea, sore throat, anosmia, loss of taste, shortness of breath, chest tightness, abdominal pain, nausea, vomiting, diarrhea, myalgias and headaches  - Date of symptom onset: 9/5/2022      COVID-19 vaccination status: Partially vaccinated    HPI:  This is a 76-year-old gentleman that presents via virtual video visit using [employer] platform  Seen accompanied by his father  He started with symptoms of COVID infection on the 5th of September and went for monoclonal antibody therapy on the 10th  Mother states that his symptoms have improved this morning  He came out from in his room and asked what there was to eat which he has not done in some time  It had taken him a while for his appetite to return  His pulse ox levels have been staying at 97% or above  He has not had any struggle getting around the house or doing steps  No results found for: Shemar Mail, [address] [facility], [place], [place], [address] [facility]    Review of Systems   Constitutional: Positive for fatigue  Negative for chills and fever  HENT: Negative for congestion, rhinorrhea and sore throat  Respiratory: Positive for cough  Negative for chest tightness and shortness of breath  Gastrointestinal: Negative for abdominal pain, diarrhea, nausea and vomiting  Musculoskeletal: Negative for myalgias  Neurological: Negative for headaches  Current Outpatient Medications on File Prior to Visit   Medication Sig    Ascorbic Acid (VITAMIN C) 1000 MG tablet Take 3,000 mg by mouth daily (Patient not taking: No sig reported)    benzonatate (TESSALON) 200 MG capsule Take 1 capsule (200 mg total) by mouth 3 (three) times a day as needed for cough for up to 10 days    Cholecalciferol (VITAMIN D3 PO) Take 2,000 Units by mouth daily    divalproex sodium (DEPAKOTE ER) 250 mg 24 hr tablet Take 3 tablets (750 mg total) by mouth every morning AND 4 tablets (1,000 mg total) every evening   Krill Oil 1000 MG CAPS Take 2 capsules by mouth daily     lacosamide (Vimpat) 150 mg tablet Take two tablets (300 mg) by mouth every 12 hours    levETIRAcetam (KEPPRA) 1000 MG tablet Take 1 5 tablets (1,500 mg total) by mouth every morning AND 2 tablets (2,000 mg total) daily at bedtime   Livalo 2 MG Take 2 mg by mouth in the morning       Objective: There were no vitals taken for this visit  Physical Exam  Constitutional:       General: He is not in acute distress  Appearance: He is well-developed  HENT:      Head: Normocephalic and atraumatic  Eyes:      Conjunctiva/sclera: Conjunctivae normal    Pulmonary:      Effort: Pulmonary effort is normal    Musculoskeletal:      Cervical back: Normal range of motion  Skin:     Findings: No rash  Neurological:      Mental Status: He is alert and oriented to person, place, and time         Irena Mack PA-C

## 2023-01-24 ENCOUNTER — TELEPHONE (OUTPATIENT)
Dept: NEUROLOGY | Facility: CLINIC | Age: 33
End: 2023-01-24

## 2023-01-24 DIAGNOSIS — G40.219 PARTIAL SYMPTOMATIC EPILEPSY WITH COMPLEX PARTIAL SEIZURES, INTRACTABLE, WITHOUT STATUS EPILEPTICUS (HCC): Primary | ICD-10-CM

## 2023-01-24 DIAGNOSIS — G40.919 INTRACTABLE EPILEPSY (HCC): ICD-10-CM

## 2023-01-25 RX ORDER — LACOSAMIDE 150 MG/1
TABLET ORAL
Qty: 360 TABLET | Refills: 1 | Status: SHIPPED | OUTPATIENT
Start: 2023-01-25 | End: 2023-03-27 | Stop reason: SDUPTHER

## 2023-01-25 NOTE — TELEPHONE ENCOUNTER
Fax received from [address] [facility] [place] is no longer formulary  Lacosamide is covered  Plan is requesting new script for covered lacosamide or coverage review/[place] if special circumstances exist that patient cannot be on generic med, would need to notify plan why  Please advise how you would like to proceed

## 2023-01-25 NOTE — TELEPHONE ENCOUNTER
Ok to change to generic lacosamide  Can check a lacosamide level 1-2 weeks after transition to generic  He is overdue for f/u  Sending Rx  It is not marked [facility]

## 2023-01-25 NOTE — TELEPHONE ENCOUNTER
Called back re: below and left detailed VM with call back to schedule future appt or if assistance is required

## 2023-03-27 DIAGNOSIS — G40.919 INTRACTABLE EPILEPSY (HCC): ICD-10-CM

## 2023-03-28 RX ORDER — LACOSAMIDE 150 MG/1
TABLET ORAL
Qty: 360 TABLET | Refills: 0 | Status: SHIPPED | OUTPATIENT
Start: 2023-03-28 | End: 2023-03-29 | Stop reason: SDUPTHER

## 2023-03-29 NOTE — TELEPHONE ENCOUNTER
received -Hi, my name is Justin Reyes  Not sure why I'm leaving a message also, but I was to get a call back [phone]  Regarding my son Kiko Nicholson' Medicine  This is for vimpat actually the generic 150mg 4 pills a day, 2 at night, 2 in the Morning  we've got some issues where the prescription was called in and I need it taken care of because he needs pills for Monday night  So please give me a call back  Thank you   --------------------------------------------  called pt's mom, advised that script was sent to [facility] yesterday  she states that [facility] said that it was filled on 3/13 and too early to fill   she checked with each pharm that she uses and she states that she never picked up lacosamide at the medicine shop on 3/13  they told her that they have med in stock and will be able to fill now      she would like script sent to the medicine shop, she is asking for a 90 day supply     Script entered for 90 day supply  Please sign off

## 2023-03-30 RX ORDER — LACOSAMIDE 150 MG/1
TABLET ORAL
Qty: 360 TABLET | Refills: 1 | Status: SHIPPED | OUTPATIENT
Start: 2023-03-30

## 2023-03-30 NOTE — TELEPHONE ENCOUNTER
Hi, my name is Lily Dykes  I'm calling in reference to my son Lorraine Clark  I've been trying to take care of his Vimpat since Monday  Did some investigating yesterday with the 2 different pharmacies  Apparently something was called in March 13th, but neither pharmacy has it  David Guaman needs this medicine on Monday  This is for the generic Vimpatt  The medicine shop in [place] has it  They just need a prescription  I asked for a 90 day prescription to the Medicine shop for David Guaman 150 milligrams for tablets  2 twice a day total for tablets a day, but they are only open tomorrow and Saturday until 1  So I need to have this done  I spoke to someone yesterday, verified with the pharmacy, they don't have it yet  So if this could please be done, I would appreciate it  Thank you   [phone]    Called [phone], spoke w/ pt's mom Enzo to let her know that I received her message and will forward it to Dr Panchal Certain  Rx pending   Pls review and sign off    thanks

## 2023-05-30 DIAGNOSIS — G40.919 INTRACTABLE EPILEPSY (HCC): ICD-10-CM

## 2023-05-30 RX ORDER — LACOSAMIDE 150 MG/1
TABLET ORAL
Qty: 360 TABLET | Refills: 0 | Status: CANCELLED | OUTPATIENT
Start: 2023-05-30

## 2023-05-31 RX ORDER — LEVETIRACETAM 1000 MG/1
TABLET ORAL
Qty: 315 TABLET | Refills: 0 | Status: SHIPPED | OUTPATIENT
Start: 2023-05-31

## 2023-05-31 RX ORDER — DIVALPROEX SODIUM 250 MG/1
TABLET, EXTENDED RELEASE ORAL
Qty: 630 TABLET | Refills: 0 | Status: SHIPPED | OUTPATIENT
Start: 2023-05-31 | End: 2023-06-06 | Stop reason: SDUPTHER

## 2023-06-05 ENCOUNTER — TELEPHONE (OUTPATIENT)
Dept: NEUROLOGY | Facility: CLINIC | Age: 33
End: 2023-06-05

## 2023-06-05 NOTE — TELEPHONE ENCOUNTER
received -Hi, this is [employer]  Ene Carter is my son  His birth date is [birth year]  I received a message today as a reminder of our 10:30 appointment  It did not say a date or anything that was going on  I thought his appointment is tomorrow at 8 and I need some clarification on that  If it was today, that is not when I was expecting it, but I need somebody to call me back  Thank you    [phone]  -------------------------------------  appt is 6/6 at 10:30am-virtual    Called pt's mom and made her aware of appt date and time

## 2023-06-06 ENCOUNTER — TELEMEDICINE (OUTPATIENT)
Dept: NEUROLOGY | Facility: CLINIC | Age: 33
End: 2023-06-06
Payer: MEDICARE

## 2023-06-06 DIAGNOSIS — F70 MILD INTELLECTUAL DISABILITIES: ICD-10-CM

## 2023-06-06 DIAGNOSIS — G40.919 INTRACTABLE EPILEPSY (HCC): Primary | ICD-10-CM

## 2023-06-06 PROCEDURE — 99214 OFFICE O/P EST MOD 30 MIN: CPT | Performed by: PSYCHIATRY & NEUROLOGY

## 2023-06-06 RX ORDER — LACOSAMIDE 150 MG/1
TABLET ORAL
Qty: 360 TABLET | Refills: 1 | Status: SHIPPED | OUTPATIENT
Start: 2023-06-06

## 2023-06-06 RX ORDER — DIVALPROEX SODIUM 250 MG/1
TABLET, EXTENDED RELEASE ORAL
Qty: 630 TABLET | Refills: 3 | Status: SHIPPED | OUTPATIENT
Start: 2023-06-06

## 2023-06-06 NOTE — PROGRESS NOTES
Virtual Regular Visit    Verification of patient location:    Patient is located at Home in the following state in which I hold an active license [place]      Assessment/Plan:    Problem List Items Addressed This Visit        Nervous and Auditory    Intractable epilepsy ([address] ) - Primary    Relevant Medications    lacosamide (Vimpat) 150 mg tablet    divalproex sodium (DEPAKOTE ER) 250 mg 24 hr tablet       Other    Mild intellectual disabilities            Reason for visit is   Chief Complaint   Patient presents with   • Virtual Regular Visit        Encounter provider Lily Mckeon MD    Provider located at [address] [address] [address] [facility]      Recent Visits  No visits were found meeting these conditions  Showing recent visits within past 7 days and meeting all other requirements  Today's Visits  Date Type Provider Dept   06/06/23 [facility] Natty Bliss MD  Neuro [address] [place] [address] today's visits and meeting all other requirements  Future Appointments  No visits were found meeting these conditions  Showing future appointments within next 150 days and meeting all other requirements       The patient was identified by name and date of birth  Sherry Kalen was informed that this is a telemedicine visit and that the visit is being conducted through the [employer]  He agrees to proceed     My office door was closed  The patient was notified the following individuals were present in the room: medical student  He acknowledged consent and understanding of privacy and security of the video platform  The patient has agreed to participate and understands they can discontinue the visit at any time  Patient is aware this is a billable service         [facility]  Follow Up Visit    Impression/Plan    Mr Asuncion He is a 28 y o  male with mild developmental delay / static encephalopathy "presenting regarding epilepsy manifest as focal aware/impaired aware seizures (one per month) and secondary generalized tonic clonic seizures (controlled)  Generalized epilepsy is also possible  His neurological exam is normal      Seizures improved some after addition of divalproex in the EMU in 2021  Benefit of levetiracetam is unclear and could impact mood  Will gradually decrease levetiracetam  Consider lamotrigine in the future after levetiracetam is stopped or if seizures worsen  Also discussed zonisamide, but father and brother have history of nephrolithiasis  Patient Instructions   1  Decrease levetiracetam to 1500 mg twice daily x 1 month, then 1000 mg morning and 1500 mg evening x 1 months and then take 1000 mg twice daily  2  Continue divalproex and lacosamide unchanged  3  Consider lamotrigine in there future  4  Let us know if seizures worsen  5  Return in about 4 months  Diagnoses and all orders for this visit:    Intractable epilepsy ([facility] [address] )  -     lacosamide (Vimpat) 150 mg tablet; Take two tablets (300 mg) by mouth every 12 hours  -     divalproex sodium (DEPAKOTE ER) 250 mg 24 hr tablet; Take 3 tablets (750 mg total) by mouth every morning AND 4 tablets (1,000 mg total) every evening  Mild intellectual disabilities        Subjective    Alannah Álvarez is returning to the [place] [address] [facility] for follow up  Interval Events:   Seizures since last visit: yes  Hospitalizations: no    Last seen summer 2022 with plan for 4 month follow up  One every 2-3 weeks, very short, 20-25 seconds, might miss it  Sometimes noticed at work, maybe once every 6 weeks  Not clearly following increased stress like they thought in the past  He has no recollection of the event  Arms go up and inhales and repeats \"I'm fine\" when asked if ok  Continues to make progress with independence  Looking for apartment       Current AEDs:  Levetiracetam 1500 mg AM / 2000 mg PM  Lacosamide 300 " mg bid  Divalproex  mg AM / 1000 mg PM  Medication side effects: None  Medication adherence: Yes     Event/Seizure semiology:  • Focal impaired aware seizures  Relative behavior arrest, mild facial expression change, restless movements, extending legs, touching face/head with right arm and possible expressive aphasia (capture during 6/2021 EMU admission, diffuse bihemispheric eeg change)  • Generalized tonic clonic seizure (currently controlled)     Special Features  Status epilepticus: no  Self Injury Seizures: unknown  Precipitating Factors: unknown     Epilepsy Risk Factors:  Full term, normal birth  Early intervention at about 18 months  Went to [facility]  Given diagnosis of mild/moderate MR  Walked and talked late  Talked at about 1 yo  2 years behind in school  Inclusion sometimes  Life skills program in middle and high school  Stopped school at 23  Works 3 days, 4 hours each  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  Obtained eagle        Prior AEDs:  Phenytoin     Prior Evaluation:  Sedated MRI brain done in 5/2019  Study was normal    All EEGs have been normal       05/21/2020 EEG awake and asleep (Dr Sheila Rodriguez): Intermittent brief bursts of diffuse, right hemisphere dominant, 3-4 cps slowing as well as similar slowing isolated to the right hemisphere and maximal over the temporal region suggest underlying neuronal dysfunction with possible focal neuronal dysfunction in the right temporal region  Some of the bursts contain poorly formed, poorly localized, sharp components that are of uncertain significance       24 hour AEEG 6/30/2020:  Sporadic right temporal delta activities suggests an underlying area of neuronal dysfunction  The frequent runs of diffuse rhythmic theta activities that were maximal in the right temporal region are of unclear significance  These were typically brief and without clear evolution   Some features (diffuse distribution, abrupt start/stop, "and on one occasion, increase in frequency over course of burst) would be suggestive of SREDA, however, patients age and short duration of this activity would be atypical for this variant  Overall, these are favored to be a benign variant, but the possibility of brief ictal rhythmic discharges cannot fully be excluded  A run of bilateral frontal delta activities is favored to represent artifact       EMU admission 6/2021:   Summary interictal findings:   Generalized sharp/spike and slow wave discharges as well as brief potentially ictal rhythmic discharges (BIRDs) occurring during drowsiness and sleep suggest underlying epileptogenic potential  Intermittent diffuse and mild independent right more than left polymorphic theta/delta activities may represent fragments of generalized discharges/slowing    Summary event findings:   There were 4 brief electroclinical seizures (2 clusters of 2 seizures each) with diffuse bi hemispheric ictal rhyhtmic theta/alpha activity  The events were identified by the patient's mother as mild typical events  The seizures involve relative behavior arrest, mild facial expression change, restless movements, extending legs, touching face/head with right arm and possible expressive aphasia  The patient repeated \"I'm fine\" in response to prompts from his mother as the events ended  Seizure Classification:   Focal impaired aware (captured during this admission)  Generalized tonic clonic seizure (not captured during this admission, currently controlled)  Epilepsy Classification: Focal epilepsy with focal impaired aware seizures and bilateral tonic clonic seizures out of sleep versus generalized epilepsy    EMU findings and discussion:  Patient was admitted to the EMU for spell characterization  Since his events were occurring about 3 times per week at home, no medication weaning or sleep deprivation was required   He had a total of 4 brief typical seizures which occurred on day 1 (2 " seizures) and day 2 (2 seizure) of admission  Since his seizures were not controlled on his home AED regimen of lacosamide 300 mg BID and levetiracetam 1500 mg in the morning and 2000 mg at night, an additional AED was added  AEDs were reviewed with patient and mother and ultimately divalproex was added at 1000 mg twice per day  Plan to continue this new medication at home and consider decreasing or stopping lacosamide at follow up if warranted  Recommend valproic acid level, CBC, and CMP in one week after discharge          History Reviewed: The following were reviewed and updated as appropriate: allergies, current medications, past medical history, past social history, and problem list     Psychiatric History:  None     Social History:   Driving: No  Lives Alone:no  Occupation: Works 4 days, 4 hours each      Objective    There were no vitals taken for this visit  General Exam  No acute distress  Neurologic Exam  Mental Status:  Alert and orient x recent events  Language: normal fluency  Cranial Nerves: Face symmetric  No dysarthria  Review of Systems   Constitutional: Negative  Negative for appetite change and fever  HENT: Negative  Negative for hearing loss, tinnitus, trouble swallowing and voice change  Eyes: Negative  Negative for photophobia, pain and visual disturbance  Respiratory: Negative  Negative for shortness of breath  Cardiovascular: Negative  Negative for palpitations  Gastrointestinal: Negative  Negative for nausea and vomiting  Endocrine: Negative  Negative for cold intolerance  Genitourinary: Negative  Negative for dysuria, frequency and urgency  Musculoskeletal: Negative  Negative for gait problem, myalgias and neck pain  Skin: Negative  Negative for rash  Allergic/Immunologic: Negative  Neurological: Positive for seizures (Intermittent focal seizuere, one every couple weeks and very short about 20 seconds)   Negative for dizziness, tremors, syncope, facial asymmetry, speech difficulty, weakness, light-headedness, numbness and headaches  Hematological: Negative  Does not bruise/bleed easily  Psychiatric/Behavioral: Negative  Negative for confusion, hallucinations and sleep disturbance  All other systems reviewed and are negative  ROS reviewed and updated as appropriate          Visit Time  Total Visit Duration: 15

## 2023-06-06 NOTE — PATIENT INSTRUCTIONS
Decrease levetiracetam to 1500 mg twice daily x 1 month, then 1000 mg morning and 1500 mg evening x 1 months and then take 1000 mg twice daily  Continue divalproex and lacosamide unchanged  Consider lamotrigine in there future  Let us know if seizures worsen  Return in about 4 months

## 2023-07-18 NOTE — TELEPHONE ENCOUNTER
Medication refill check list    Correct patient? Y   Correct medication name, dose, and pill size? Y-levetiracetam 1000 mg tab   Correct provider? Y-Ashraf   Last and Next appt  scheduled? 11/5/19-Y-5/18/2020   Right pharmacy listed? [facility]   Correct quantity for 30 or 90 days? Y-90 days   Is the patient out of refills? When was it last prescribed? Y-11/5/19   Directions match what the patient says they are taking? Y-Take 1 5 tablets (1,500 mg total) by mouth every morning AND 2 tablets (2,000 mg total) daily at bedtime     Enough refills? (none for controlled substances, 1 year for routine medications) Y-3 Clindamycin Pregnancy And Lactation Text: This medication can be used in pregnancy if certain situations. Clindamycin is also present in breast milk.

## 2023-08-01 DIAGNOSIS — G40.919 INTRACTABLE EPILEPSY (HCC): ICD-10-CM

## 2023-08-01 RX ORDER — LEVETIRACETAM 1000 MG/1
TABLET ORAL
Qty: 270 TABLET | Refills: 0 | Status: SHIPPED | OUTPATIENT
Start: 2023-08-01

## 2023-08-01 NOTE — TELEPHONE ENCOUNTER
Pharmacy requesting refills of levetiracetam. He was given instructions to gradually decrease dose at last visit in June. Please confirm current dose and determine what Rx is needed. Thanks.

## 2023-10-10 ENCOUNTER — TELEMEDICINE (OUTPATIENT)
Dept: NEUROLOGY | Facility: CLINIC | Age: 33
End: 2023-10-10
Payer: MEDICARE

## 2023-10-10 DIAGNOSIS — G40.019 PARTIAL IDIOPATHIC EPILEPSY WITH SEIZURES OF LOCALIZED ONSET, INTRACTABLE, WITHOUT STATUS EPILEPTICUS (HCC): Primary | ICD-10-CM

## 2023-10-10 DIAGNOSIS — F70 MILD INTELLECTUAL DISABILITIES: ICD-10-CM

## 2023-10-10 PROCEDURE — 99214 OFFICE O/P EST MOD 30 MIN: CPT | Performed by: PSYCHIATRY & NEUROLOGY

## 2023-10-10 NOTE — PROGRESS NOTES
[facility] Neurology [address]  Follow Up Visit    Impression/Plan    Mr. Paola Oh is a 28 y.o. male with mild developmental delay / static encephalopathy presenting regarding epilepsy manifest as focal aware/impaired aware seizures (1-4 per month) and secondary generalized tonic clonic seizures (controlled). Generalized epilepsy is also possible. His neurological exam is normal.     Seizures improved some after addition of divalproex in the EMU in 2021. Benefit of levetiracetam is unclear and could impact mood. We have been gradually decrease levetiracetam. Mild FIAS are a bit more frequent over the last 2 months as levetiracetam was decreased, but this may be coincidence or there may have been provocation other than levetiracetam decrease. Will continue doses unchanged for the next montha nd then touch base to consider further levetiracetam decrease. Consider lamotrigine in the future if another ASM is needed. Also discussed zonisamide in the past, but father and brother have history of nephrolithiasis. Patient Instructions   1. Continue 1000 mg morning and 1500 mg afternoon for one month. Contact us after one month to consider further decrease in levetiracetam.   2. Let us know if seizures worsen. 3. Continue lacosamide and divalproex unchanged. 4. Return in about 3-4 months. Diagnoses and all orders for this visit:    Partial idiopathic epilepsy with seizures of localized onset, intractable, without status epilepticus ([address])    Mild intellectual disabilities        Subjective    Rissa Yang is returning to the [facility] for follow up. Interval Events:   Seizures since last visit: yes, one FIAS about every week lately (recent baseline is 1-2 per month)  Hospitalizations: no    planned to decrease levetiracetam gradually to 1000 mg bid at last visit. Through summer on really hot days he had a few seizures.  Was on cruise, in 100s and had a couple that week. Had symptoms of covid, but never tested positive, was exhausted, in august. Now down to [id number] levetiracetam (since mid sep) and then had about weekly FIAS, often just before afternoon dose of levetiracetam. 7 am and 3 pm dosing. Works from 3:30 to 7:30. Current AEDs:  Levetiracetam 2849-7993 mg   Lacosamide 300 mg bid  Divalproex  mg AM / 1000 mg PM  Medication side effects: None  Medication adherence: Yes     Event/Seizure semiology:  • Focal impaired aware seizures. Relative behavior arrest, mild facial expression change, restless movements, extending legs, touching face/head with right arm and possible expressive aphasia (capture during 6/2021 EMU admission, diffuse bihemispheric eeg change)  • Generalized tonic clonic seizure (currently controlled)     Special Features  Status epilepticus: no  Self Injury Seizures: unknown  Precipitating Factors: unknown     Epilepsy Risk Factors:  Full term, normal birth. Early intervention at about 18 months. Went to [facility]. Given diagnosis of mild/moderate  Walked and talked late. Talked at about 1 yo. 2 years behind in school. Inclusion sometimes. Life skills program in middle and high school. Stopped school at 23. Works 3 days, 4 hours each. 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 Obtained [employer] .      Prior AEDs:  Phenytoin     Prior Evaluation:  Sedated MRI brain done in 5/2019. Study was normal.   All EEGs have been normal.      05/21/2020 EEG awake and asleep (Dr Lady Hunt): Intermittent brief bursts of diffuse, right hemisphere dominant, 3-4 cps slowing as well as similar slowing isolated to the right hemisphere and maximal over the temporal region suggest underlying neuronal dysfunction with possible focal neuronal dysfunction in the right temporal region.  Some of the bursts contain poorly formed, poorly localized, sharp components that are of uncertain significance.      24 hour AEEG 6/30/2020:  Sporadic right temporal delta activities suggests an underlying area of neuronal dysfunction. The frequent runs of diffuse rhythmic theta activities that were maximal in the right temporal region are of unclear significance. These were typically brief and without clear evolution. Some features (diffuse distribution, abrupt start/stop, and on one occasion, increase in frequency over course of burst) would be suggestive of SREDA, however, patients age and short duration of this activity would be atypical for this variant. Overall, these are favored to be a benign variant, but the possibility of brief ictal rhythmic discharges cannot fully be excluded. A run of bilateral frontal delta activities is favored to represent artifact.      EMU admission 6/2021:   Summary interictal findings:   Generalized sharp/spike and slow wave discharges as well as brief potentially ictal rhythmic discharges (BIRDs) occurring during drowsiness and sleep suggest underlying epileptogenic potential. Intermittent diffuse and mild independent right more than left polymorphic theta/delta activities may represent fragments of generalized discharges/slowing.   Summary event findings:   There were 4 brief electroclinical seizures (2 clusters of 2 seizures each) with diffuse bi hemispheric ictal rhyhtmic theta/alpha activity. The events were identified by the patient's mother as mild typical events. The seizures involve relative behavior arrest, mild facial expression change, restless movements, extending legs, touching face/head with right arm and possible expressive aphasia. The patient repeated "I'm fine" in response to prompts from his mother as the events ended.    Seizure Classification:   Focal impaired aware (captured during this admission)  Generalized tonic clonic seizure (not captured during this admission, currently controlled)  Epilepsy Classification: Focal epilepsy with focal impaired aware seizures and bilateral tonic clonic seizures out of sleep versus generalized epilepsy.   EMU findings and discussion:  Patient was admitted to the EMU for spell characterization. Since his events were occurring about 3 times per week at home, no medication weaning or sleep deprivation was required. He had a total of 4 brief typical seizures which occurred on day 1 (2 seizures) and day 2 (2 seizure) of admission. Since his seizures were not controlled on his home AED regimen of lacosamide 300 mg BID and levetiracetam 1500 mg in the morning and 2000 mg at night, an additional AED was added. AEDs were reviewed with patient and mother and ultimately divalproex was added at 1000 mg twice per day. Plan to continue this new medication at home and consider decreasing or stopping lacosamide at follow up if warranted. Recommend valproic acid level, CBC, and CMP in one week after discharge.         History Reviewed: The following were reviewed and updated as appropriate: allergies, current medications, past medical history, past social history, and problem list     Psychiatric History:  None     Social History:   Driving: No  Lives Alone:no  Occupation: Works 4 days, 4 hours each      Objective    There were no vitals taken for this visit. General Exam  No acute distress. Neurologic Exam  Mental Status:  Alert and interactive. Language: normal fluency and comprehension. Cranial Nerves: Face symmetric. No dysarthria.

## 2023-10-10 NOTE — PATIENT INSTRUCTIONS
Continue 1000 mg morning and 1500 mg afternoon for one month. Contact us after one month to consider further decrease in levetiracetam.   Let us know if seizures worsen. Continue lacosamide and divalproex unchanged. Return in about 3-4 months.

## 2023-10-10 NOTE — PROGRESS NOTES
Review of Systems   Constitutional: Negative. HENT: Negative. Eyes: Negative. Respiratory: Negative. Cardiovascular: Negative. Gastrointestinal: Negative. Endocrine: Negative. Genitourinary: Negative. Musculoskeletal: Negative. Skin: Negative. Allergic/Immunologic: Negative. Neurological: Positive for seizures (pt mother states several focal seizure due to changes in medications and seasons ). Hematological: Negative. Psychiatric/Behavioral: Negative. All other systems reviewed and are negative.

## 2023-10-23 DIAGNOSIS — G40.919 INTRACTABLE EPILEPSY (HCC): ICD-10-CM

## 2023-10-23 RX ORDER — DIVALPROEX SODIUM 250 MG/1
TABLET, EXTENDED RELEASE ORAL
Qty: 630 TABLET | Refills: 0 | Status: CANCELLED | OUTPATIENT
Start: 2023-10-23

## 2023-10-23 RX ORDER — LACOSAMIDE 150 MG/1
TABLET ORAL
Qty: 360 TABLET | Refills: 0 | Status: CANCELLED | OUTPATIENT
Start: 2023-10-23

## 2023-10-24 RX ORDER — LEVETIRACETAM 1000 MG/1
TABLET ORAL
Qty: 225 TABLET | Refills: 1 | Status: SHIPPED | OUTPATIENT
Start: 2023-10-24

## 2023-11-27 DIAGNOSIS — G40.919 INTRACTABLE EPILEPSY (HCC): ICD-10-CM

## 2023-11-27 RX ORDER — LACOSAMIDE 150 MG/1
TABLET ORAL
Qty: 360 TABLET | Refills: 1 | Status: SHIPPED | OUTPATIENT
Start: 2023-11-27

## 2024-02-08 ENCOUNTER — TELEMEDICINE (OUTPATIENT)
Dept: NEUROLOGY | Facility: CLINIC | Age: 34
End: 2024-02-08
Payer: MEDICARE

## 2024-02-08 DIAGNOSIS — G40.019 PARTIAL IDIOPATHIC EPILEPSY WITH SEIZURES OF LOCALIZED ONSET, INTRACTABLE, WITHOUT STATUS EPILEPTICUS (HCC): Primary | ICD-10-CM

## 2024-02-08 DIAGNOSIS — F70 MILD INTELLECTUAL DISABILITIES: ICD-10-CM

## 2024-02-08 PROCEDURE — 99214 OFFICE O/P EST MOD 30 MIN: CPT | Performed by: PSYCHIATRY & NEUROLOGY

## 2024-02-08 RX ORDER — LAMOTRIGINE 25 MG/1
TABLET, EXTENDED RELEASE ORAL
Qty: 60 TABLET | Refills: 0 | Status: SHIPPED | OUTPATIENT
Start: 2024-02-08

## 2024-02-08 RX ORDER — LAMOTRIGINE 100 MG/1
100 TABLET, EXTENDED RELEASE ORAL DAILY
Qty: 30 TABLET | Refills: 5 | Status: SHIPPED | OUTPATIENT
Start: 2024-03-28

## 2024-02-08 NOTE — PROGRESS NOTES
Review of Systems   Constitutional:  Negative for appetite change, fatigue and fever.   HENT: Negative.  Negative for hearing loss, tinnitus, trouble swallowing and voice change.    Eyes: Negative.  Negative for photophobia, pain and visual disturbance.   Respiratory: Negative.  Negative for shortness of breath.    Cardiovascular: Negative.  Negative for palpitations.   Gastrointestinal: Negative.  Negative for nausea and vomiting.   Endocrine: Negative.  Negative for cold intolerance.   Genitourinary: Negative.  Negative for dysuria, frequency and urgency.   Musculoskeletal:  Negative for back pain, gait problem, myalgias, neck pain and neck stiffness.   Skin: Negative.  Negative for rash.   Allergic/Immunologic: Negative.    Neurological:  Positive for seizures. Negative for dizziness, tremors, syncope, facial asymmetry, speech difficulty, weakness, light-headedness, numbness and headaches.   Hematological: Negative.  Does not bruise/bleed easily.   Psychiatric/Behavioral: Negative.  Negative for confusion, hallucinations and sleep disturbance.    All other systems reviewed and are negative.

## 2024-02-08 NOTE — PROGRESS NOTES
[facility] - Epilepsy Center  Follow Up Visit    Impression/Plan    Mr. Pang is a 33 y.o. male with mild developmental delay / static encephalopathy presenting regarding epilepsy manifest as focal aware/impaired aware seizures (1-4 per month) and secondary generalized tonic clonic seizures (controlled). Generalized epilepsy is also possible. His neurological exam is normal.     Seizures improved some after addition of divalproex in the EMU in 2021. Benefit of levetiracetam is unclear and could impact mood. We have been gradually decrease levetiracetam. FIAS remain uncontrolled. Will add lamotrigine. Consider further decrease in levetiracetam at next visit.        Patient Instructions   Start lamotrigine ER. Instructions below.   Continue other seizure medications unchanged.   Have blood work done about one week before next visit.       Lamotrigine ER Instructions    Week 1-2:  25 mg every other day  Week 3-4:  25 mg daily   Week 5: 50 mg daily   Week 6:  75 mg daily  Week 7:  100 mg daily (continue on this dose, change to 100 mg pills)    Lamotrigine can cause a dangerous rash. Contact a physician immediately if you develop a rash.      Diagnoses and all orders for this visit:    Partial idiopathic epilepsy with seizures of localized onset, intractable, without status epilepticus (HCC)  -     lamoTRIgine ER 25 MG TB24; 1 tab every other day x 2 wks, 1 tab daily x 2 wks, 2 tabs daily x 1 wk, 3 tabs daily x 1 wk and then start 100 mg pills  -     lamoTRIgine  MG TB24; Take 1 tablet (100 mg total) by mouth daily Do not start before March 28, 2024.  -     Lamotrigine level; Future  -     Lacosamide; Future  -     Valproic acid level, total; Future  -     Levetiracetam level; Future  -     CBC; Future  -     Comprehensive metabolic panel; Future    Mild intellectual disabilities        Subjective    Gt Pang is returning to the [facility] for follow up.  "    Interval Events:   Seizures since last visit: 2 per month    Last seen 10'/2023. Seizures were increased leading up tot hat visit, occurring about weekly when baseline was 1-2 per month. We had been decreasing levetiracetam, but kept levetiracetam unchanged because of the increased seizures.     Seizures about twice per month. Brief, relatively mild. 3 seizures while on vacation. Jan 20, [place]. There for a week. Wed night of that week, he got up at 4 am. He said he had to use the bathroom, but got back in bed and then back up. When asked if ok he said \"I'm fine\" which is his response when he has a seizure. When they turned on the lights they could see his facial expression was consistent with seizure. 6 hours later he had another typical seizure while walking int he park. He was able to sit down and was back to baseline within 30 seconds. On Friday he was on [employer] ([email or web address] He started saying \"I don't like this\", which is atypical. Shortly after getting off the ride he had another FIAS. Coworker said that he had 2 seizures back to back yesterday which is not typical. Family wonders if there may be undetected seizures. He is staying in his own apartment at times, but family remains with him when he is there because they do not feel comfortable leaving him alone.     Current AEDs:  Levetiracetam 4931-4880 mg   Lacosamide 300 mg bid  Divalproex  mg AM / 1000 mg PM  Medication side effects: None  Medication adherence: Yes     Event/Seizure semiology:  Focal impaired aware seizures. Relative behavior arrest, mild facial expression change, restless movements, extending legs, touching face/head with right arm and possible expressive aphasia (capture during 6/2021 EMU admission, diffuse bihemispheric eeg change)  Generalized tonic clonic seizure (currently controlled)     Special Features  Status epilepticus: no  Self Injury Seizures: unknown  Precipitating Factors: unknown     Epilepsy Risk Factors:  Full " term, normal birth. Early intervention at about 18 months. Went to [facility]. Given diagnosis of mild/moderate  Walked and talked late. Talked at about 2 yo. 2 years behind in school. Inclusion sometimes. Life skills program in middle and high school. Stopped school at 20. Works 3 days, 4 hours each. 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 Obtained [employer] .      Prior AEDs:  Phenytoin     Prior Evaluation:  Sedated MRI brain done in 5/2019. Study was normal.   All EEGs have been normal.      05/21/2020 EEG awake and asleep (Dr Ashraf): Intermittent brief bursts of diffuse, right hemisphere dominant, 3-4 cps slowing as well as similar slowing isolated to the right hemisphere and maximal over the temporal region suggest underlying neuronal dysfunction with possible focal neuronal dysfunction in the right temporal region. Some of the bursts contain poorly formed, poorly localized, sharp components that are of uncertain significance.      24 hour AEEG 6/30/2020:  Sporadic right temporal delta activities suggests an underlying area of neuronal dysfunction.   The frequent runs of diffuse rhythmic theta activities that were maximal in the right temporal region are of unclear significance. These were typically brief and without clear evolution. Some features (diffuse distribution, abrupt start/stop, and on one occasion, increase in frequency over course of burst) would be suggestive of SREDA, however, patients age and short duration of this activity would be atypical for this variant. Overall, these are favored to be a benign variant, but the possibility of brief ictal rhythmic discharges cannot fully be excluded.   A run of bilateral frontal delta activities is favored to represent artifact.      EMU admission 6/2021:   Summary interictal findings:   Generalized sharp/spike and slow wave discharges as well as brief potentially ictal rhythmic discharges (BIRDs) occurring during  "drowsiness and sleep suggest underlying epileptogenic potential. Intermittent diffuse and mild independent right more than left polymorphic theta/delta activities may represent fragments of generalized discharges/slowing.   Summary event findings:   There were 4 brief electroclinical seizures (2 clusters of 2 seizures each) with diffuse bi hemispheric ictal rhyhtmic theta/alpha activity. The events were identified by the patient's mother as mild typical events. The seizures involve relative behavior arrest, mild facial expression change, restless movements, extending legs, touching face/head with right arm and possible expressive aphasia. The patient repeated \"I'm fine\" in response to prompts from his mother as the events ended.   Seizure Classification:   Focal impaired aware (captured during this admission)  Generalized tonic clonic seizure (not captured during this admission, currently controlled)  Epilepsy Classification: Focal epilepsy with focal impaired aware seizures and bilateral tonic clonic seizures out of sleep versus generalized epilepsy.   EMU findings and discussion:  Patient was admitted to the EMU for spell characterization. Since his events were occurring about 3 times per week at home, no medication weaning or sleep deprivation was required. He had a total of 4 brief typical seizures which occurred on day 1 (2 seizures) and day 2 (2 seizure) of admission. Since his seizures were not controlled on his home AED regimen of lacosamide 300 mg BID and levetiracetam 1500 mg in the morning and 2000 mg at night, an additional AED was added. AEDs were reviewed with patient and mother and ultimately divalproex was added at 1000 mg twice per day. Plan to continue this new medication at home and consider decreasing or stopping lacosamide at follow up if warranted. Recommend valproic acid level, CBC, and CMP in one week after discharge.         History Reviewed:   The following were reviewed and updated as " appropriate: allergies, current medications, past medical history, past social history, and problem list     Psychiatric History:  None     Social History:   Driving: No  Lives Alone:no  Occupation: Works 4 days, 4 hours each      Objective    There were no vitals taken for this visit.     General Exam  No acute distress.    Neurologic Exam  Mental Status:  Alert and interactive. .  Language: normal fluency and comprehension.  Cranial Nerves:  VFFTC. EOMI, no nystagums. Face symmetric. No dysarthria.  Motor:  No drift. Strength 5/5 throughout.  Coordination: Finger to nose intact.  DTRs: Normal and symmetric (biceps, patella).  Gait: Normal casual gait.           Virtual Regular Visit    Verification of patient location:    Patient is located at Home in the following state in which I hold an active license [place]             Reason for visit is   Chief Complaint   Patient presents with    Virtual Regular Visit          Encounter provider Swapnil Ashraf MD    Provider located at [address] [place] [address] NEUROLOGY [address] [id number]      Recent Visits  No visits were found meeting these conditions.  Showing recent visits within past 7 days and meeting all other requirements  Today's Visits  Date Type Provider Dept   02/08/24 Telemedicine Swapnil Ashraf MD [facility]   Showing today's visits and meeting all other requirements  Future Appointments  No visits were found meeting these conditions.  Showing future appointments within next 150 days and meeting all other requirements       The patient was identified by name and date of birth. Gt Pang was informed that this is a telemedicine visit and that the visit is being conducted through the Epic Embedded platform. He agrees to proceed..  My office door was closed. No one else was in the room.  He acknowledged consent and understanding of privacy and security of the video platform. The patient has  agreed to participate and understands they can discontinue the visit at any time.    Patient is aware this is a billable service.

## 2024-02-08 NOTE — PATIENT INSTRUCTIONS
Start lamotrigine ER. Instructions below.   Continue other seizure medications unchanged.   Have blood work done about one week before next visit.       Lamotrigine ER Instructions    Week 1-2:  25 mg every other day  Week 3-4:  25 mg daily   Week 5: 50 mg daily   Week 6:  75 mg daily  Week 7:  100 mg daily (continue on this dose, change to 100 mg pills)    Lamotrigine can cause a dangerous rash. Contact a physician immediately if you develop a rash.

## 2024-02-14 ENCOUNTER — PATIENT MESSAGE (OUTPATIENT)
Dept: NEUROLOGY | Facility: CLINIC | Age: 34
End: 2024-02-14

## 2024-05-02 ENCOUNTER — PATIENT MESSAGE (OUTPATIENT)
Dept: NEUROLOGY | Facility: CLINIC | Age: 34
End: 2024-05-02

## 2024-05-09 ENCOUNTER — TELEMEDICINE (OUTPATIENT)
Dept: NEUROLOGY | Facility: CLINIC | Age: 34
End: 2024-05-09
Payer: MEDICARE

## 2024-05-09 ENCOUNTER — TELEPHONE (OUTPATIENT)
Dept: NEUROLOGY | Facility: CLINIC | Age: 34
End: 2024-05-09

## 2024-05-09 DIAGNOSIS — G40.919 INTRACTABLE EPILEPSY (HCC): ICD-10-CM

## 2024-05-09 PROCEDURE — 99214 OFFICE O/P EST MOD 30 MIN: CPT | Performed by: PSYCHIATRY & NEUROLOGY

## 2024-05-09 RX ORDER — LACOSAMIDE 150 MG/1
TABLET ORAL
Qty: 360 TABLET | Refills: 1 | Status: SHIPPED | OUTPATIENT
Start: 2024-05-09

## 2024-05-09 RX ORDER — LEVETIRACETAM 1000 MG/1
TABLET ORAL
Qty: 135 TABLET | Refills: 1 | Status: SHIPPED | OUTPATIENT
Start: 2024-05-09

## 2024-05-09 RX ORDER — DIVALPROEX SODIUM 250 MG/1
TABLET, EXTENDED RELEASE ORAL
Qty: 630 TABLET | Refills: 3 | Status: SHIPPED | OUTPATIENT
Start: 2024-05-09

## 2024-05-09 RX ORDER — MULTIVIT-MIN/IRON/FOLIC ACID/K 18-600-40
500 CAPSULE ORAL
COMMUNITY

## 2024-05-09 NOTE — PROGRESS NOTES
Virtual Regular Visit    Verification of patient location:    Patient is located at Home in the following state in which I hold an active license [place]      Assessment/Plan:    Problem List Items Addressed This Visit    None  Visit Diagnoses       Intractable epilepsy (HCC)        Relevant Medications    levETIRAcetam (KEPPRA) 1000 MG tablet    divalproex sodium (DEPAKOTE ER) 250 mg 24 hr tablet    lacosamide (VIMPAT) 150 mg tablet                 Reason for visit is   Chief Complaint   Patient presents with    Virtual Regular Visit          Encounter provider Swapnil Ashraf MD      Recent Visits  No visits were found meeting these conditions.  Showing recent visits within past 7 days and meeting all other requirements  Today's Visits  Date Type Provider Dept   05/09/24 Telemedicine Swapnil Ashraf MD [facility]   Showing today's visits and meeting all other requirements  Future Appointments  No visits were found meeting these conditions.  Showing future appointments within next 150 days and meeting all other requirements       The patient was identified by name and date of birth. Gt Pang was informed that this is a telemedicine visit and that the visit is being conducted through the Epic Embedded platform. He agrees to proceed..  My office door was closed. The patient was notified the following individuals were present in the room: medical student.  He acknowledged consent and understanding of privacy and security of the video platform. The patient has agreed to participate and understands they can discontinue the visit at any time.    Patient is aware this is a billable service.     [facility]  Follow Up Visit    Impression/Plan    Mr. Pang is a 33 y.o. male with mild developmental delay / static encephalopathy presenting regarding epilepsy manifest as focal aware/impaired aware seizures (1-4 per month) and secondary generalized tonic clonic  seizures (controlled). Generalized epilepsy is also possible. His neurological exam is normal.     Seizures improved some after addition of divalproex in the EMU in 2021. Benefit of levetiracetam is unclear and could impact mood. We have been gradually decrease levetiracetam. FIAS had remained uncontrolled. Lamotrigine added 2/2024. Tired and concentration problems since starting lamotrigine. Will decrease levetiracetam, benefit unclear.     Patient Instructions   Decrease levetiracetam to 1000 mg twice daily now. In one week begin taking 500 mg morning and 1000 mg evening.   Let us know if seizures worsen.    Continue other seizure medications unchanged.  Talk to PCP about elevated Ferritin.   Return in 3-4 months.     Diagnoses and all orders for this visit:    Intractable epilepsy (HCC)  -     levETIRAcetam (KEPPRA) 1000 MG tablet; One pill twice daily for one week, then take half pill (500 mg) morning and one pill (1000 mg) evening.  -     divalproex sodium (DEPAKOTE ER) 250 mg 24 hr tablet; Take 3 tablets (750 mg total) by mouth every morning AND 4 tablets (1,000 mg total) every evening.  -     lacosamide (VIMPAT) 150 mg tablet; Take two tablets (300 mg) by mouth every 12 hours          Subjective    Gt Pang is returning to the [facility] for follow up.     Interval Events:   Seizures since last visit: 2 mild when on low dose lamotrigine  Hospitalizations: no    Started lamotrigine.     2 slight seizures on low dose of lamotrigine, seemed to not progress to the typical full focal seizure. This was in March.    More tired, not concentrating as well as int he past. Others might not notice, but mother does. Sleeps if he has the chance.     Changed lamotrigine to 8:30 at night.     Blood work collected 4/23/2020 4040 9 AM: Lamotrigine 6.5, lacosamide 6.7, valproate 108, levetiracetam 17.1, CMP revealed slightly elevated AST and ALT.    Current AEDs:  Levetiracetam 1570-6468 mg    Lacosamide 300 mg bid  Divalproex  mg AM / 1000 mg PM  Lamotrigine  mg daily.   Medication side effects: None  Medication adherence: Yes     Event/Seizure semiology:  Focal impaired aware seizures. Relative behavior arrest, mild facial expression change, restless movements, extending legs, touching face/head with right arm and possible expressive aphasia (capture during 6/2021 EMU admission, diffuse bihemispheric eeg change)  Generalized tonic clonic seizure (currently controlled)     Special Features  Status epilepticus: no  Self Injury Seizures: unknown  Precipitating Factors: unknown     Epilepsy Risk Factors:  Full term, normal birth. Early intervention at about 18 months. Went to [facility]. Given diagnosis of mild/moderate  Walked and talked late. Talked at about 2 yo. 2 years behind in school. Inclusion sometimes. Life skills program in middle and high school. Stopped school at 20. Works 3 days, 4 hours each. 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 Obtained [employer] .      Prior AEDs:  Phenytoin     Prior Evaluation:  Sedated MRI brain done in 5/2019. Study was normal.   All EEGs have been normal.      05/21/2020 EEG awake and asleep (Dr Ashraf): Intermittent brief bursts of diffuse, right hemisphere dominant, 3-4 cps slowing as well as similar slowing isolated to the right hemisphere and maximal over the temporal region suggest underlying neuronal dysfunction with possible focal neuronal dysfunction in the right temporal region. Some of the bursts contain poorly formed, poorly localized, sharp components that are of uncertain significance.      24 hour AEEG 6/30/2020:  Sporadic right temporal delta activities suggests an underlying area of neuronal dysfunction.   The frequent runs of diffuse rhythmic theta activities that were maximal in the right temporal region are of unclear significance. These were typically brief and without clear evolution. Some features  "(diffuse distribution, abrupt start/stop, and on one occasion, increase in frequency over course of burst) would be suggestive of SREDA, however, patients age and short duration of this activity would be atypical for this variant. Overall, these are favored to be a benign variant, but the possibility of brief ictal rhythmic discharges cannot fully be excluded.   A run of bilateral frontal delta activities is favored to represent artifact.      EMU admission 6/2021:   Summary interictal findings:   Generalized sharp/spike and slow wave discharges as well as brief potentially ictal rhythmic discharges (BIRDs) occurring during drowsiness and sleep suggest underlying epileptogenic potential. Intermittent diffuse and mild independent right more than left polymorphic theta/delta activities may represent fragments of generalized discharges/slowing.   Summary event findings:   There were 4 brief electroclinical seizures (2 clusters of 2 seizures each) with diffuse bi hemispheric ictal rhyhtmic theta/alpha activity. The events were identified by the patient's mother as mild typical events. The seizures involve relative behavior arrest, mild facial expression change, restless movements, extending legs, touching face/head with right arm and possible expressive aphasia. The patient repeated \"I'm fine\" in response to prompts from his mother as the events ended.   Seizure Classification:   Focal impaired aware (captured during this admission)  Generalized tonic clonic seizure (not captured during this admission, currently controlled)  Epilepsy Classification: Focal epilepsy with focal impaired aware seizures and bilateral tonic clonic seizures out of sleep versus generalized epilepsy.   EMU findings and discussion:  Patient was admitted to the EMU for spell characterization. Since his events were occurring about 3 times per week at home, no medication weaning or sleep deprivation was required. He had a total of 4 brief typical " seizures which occurred on day 1 (2 seizures) and day 2 (2 seizure) of admission. Since his seizures were not controlled on his home AED regimen of lacosamide 300 mg BID and levetiracetam 1500 mg in the morning and 2000 mg at night, an additional AED was added. AEDs were reviewed with patient and mother and ultimately divalproex was added at 1000 mg twice per day. Plan to continue this new medication at home and consider decreasing or stopping lacosamide at follow up if warranted. Recommend valproic acid level, CBC, and CMP in one week after discharge.         History Reviewed:   The following were reviewed and updated as appropriate: allergies, current medications, past medical history, past social history, and problem list     Psychiatric History:  None     Social History:   Driving: No  Lives Alone:no  Occupation: Works 4 days, 4 hours each      Objective    There were no vitals taken for this visit.     General Exam  No acute distress.    Neurologic Exam  Mental Status:  Alert and interactive.  Language: normal fluency and comprehension.  Cranial Nerves:   Face symmetric. No dysarthria.        Visit Time  Total Visit Duration: 12 min

## 2024-05-09 NOTE — PATIENT INSTRUCTIONS
Decrease levetiracetam to 1000 mg twice daily now. In one week begin taking 500 mg morning and 1000 mg evening.   Let us know if seizures worsen.    Continue other seizure medications unchanged.  Talk to PCP about elevated Ferritin.   Return in 3-4 months.

## 2024-05-09 NOTE — TELEPHONE ENCOUNTER
Left message for patient to call back and schedule a Return in about 4 months (around 9/9/2024) with Dr Ashraf.

## 2024-08-19 DIAGNOSIS — G40.019 PARTIAL IDIOPATHIC EPILEPSY WITH SEIZURES OF LOCALIZED ONSET, INTRACTABLE, WITHOUT STATUS EPILEPTICUS (HCC): ICD-10-CM

## 2024-08-19 RX ORDER — LAMOTRIGINE 100 MG/1
100 TABLET, EXTENDED RELEASE ORAL DAILY
Qty: 30 TABLET | Refills: 5 | Status: SHIPPED | OUTPATIENT
Start: 2024-08-19

## 2024-10-14 DIAGNOSIS — G40.919 INTRACTABLE EPILEPSY (HCC): ICD-10-CM

## 2024-10-14 RX ORDER — LACOSAMIDE 150 MG/1
TABLET ORAL
Qty: 360 TABLET | Refills: 1 | Status: SHIPPED | OUTPATIENT
Start: 2024-10-14

## 2024-10-14 NOTE — TELEPHONE ENCOUNTER
Medication: Lacosamide   PDMP  09/16/2024 05/09/2024 Lacosamide (Tablet) 120.0 30 150 MG NA HANK LAGOS THE [employer] 4 / 1 [place]   1 [id number] 08/19/2024 05/09/2024 Lacosamide (Tablet) 120.0 30 150 MG NA HANK LAGOS THE [employer] 3 / 1 [place]   1 [id number] 07/20/2024 05/09/2024 Lacosamide (Tablet) 120.0 30 150 MG NA HANK LAGOS THE [employer] 2 / 1 [place]  Active agreement on file -No

## 2024-11-07 DIAGNOSIS — G40.919 INTRACTABLE EPILEPSY (HCC): ICD-10-CM

## 2024-11-07 RX ORDER — LEVETIRACETAM 1000 MG/1
TABLET ORAL
Qty: 135 TABLET | Refills: 1 | Status: SHIPPED | OUTPATIENT
Start: 2024-11-07

## 2025-01-30 ENCOUNTER — TELEMEDICINE (OUTPATIENT)
Dept: NEUROLOGY | Facility: CLINIC | Age: 35
End: 2025-01-30
Payer: MEDICARE

## 2025-01-30 DIAGNOSIS — G40.019 PARTIAL IDIOPATHIC EPILEPSY WITH SEIZURES OF LOCALIZED ONSET, INTRACTABLE, WITHOUT STATUS EPILEPTICUS (HCC): Primary | ICD-10-CM

## 2025-01-30 DIAGNOSIS — F70 MILD INTELLECTUAL DISABILITIES: ICD-10-CM

## 2025-01-30 PROCEDURE — 99213 OFFICE O/P EST LOW 20 MIN: CPT | Performed by: PSYCHIATRY & NEUROLOGY

## 2025-01-30 RX ORDER — LEVETIRACETAM 500 MG/1
500 TABLET ORAL EVERY 12 HOURS SCHEDULED
Qty: 180 TABLET | Refills: 3 | Status: SHIPPED | OUTPATIENT
Start: 2025-01-30 | End: 2026-01-30

## 2025-01-30 NOTE — PROGRESS NOTES
[facility]  Follow Up Visit    Impression/Plan        Assessment & Plan  Partial idiopathic epilepsy with seizures of localized onset, intractable, without status epilepticus (HCC)  Gt Pang is a 34 y.o. male with mild developmental delay / static encephalopathy presenting regarding epilepsy manifest as focal aware/impaired aware seizures (baseline was 1-4 per month) and secondary generalized tonic clonic seizures (controlled). Generalized epilepsy is also possible. His neurological exam is normal.     Seizures improved some after addition of divalproex in the EMU in 2021. Benefit of levetiracetam is unclear and could impact mood. We have been gradually decrease levetiracetam. FIAS had remained uncontrolled. Lamotrigine added 2/2024. Will continue to decrease levetiracetam. Will likely wean off at next visit.      Orders:    levETIRAcetam (KEPPRA) 500 mg tablet; Take 1 tablet (500 mg total) by mouth every 12 (twelve) hours    Lacosamide; Future    Lamotrigine level; Future    Valproic acid level, total; Future    Levetiracetam level; Future    Comprehensive metabolic panel; Future    CBC; Future    Mild intellectual disabilities              Patient Instructions   Decrease levetiracetam to 500 mg twice daily.   Continue other seizure medications unchagned.   Have blood work done in the next couple months. Go to the lab before morning dose or in the late afternoon (trough levels).   Return in about 6 months.         Subjective    Gt Pang is returning to the [facility] for follow up.     Interval Events:   Last seen 5/5024. We decreased levetiracetam, benefit has been uncertain.     No seizures  for a few months. Infrequent and relatively mild.     Current AEDs:  Levetiracetam 500-1000 mg   Lacosamide 300 mg bid  Divalproex  mg AM / 1000 mg PM  Lamotrigine  mg daily.   Medication side effects: None  Medication adherence: Yes      Event/Seizure semiology:  Focal impaired aware seizures. Relative behavior arrest, mild facial expression change, restless movements, extending legs, touching face/head with right arm and possible expressive aphasia (capture during 6/2021 EMU admission, diffuse bihemispheric eeg change)  Generalized tonic clonic seizure (currently controlled)     Special Features  Status epilepticus: no  Self Injury Seizures: unknown  Precipitating Factors: unknown     Epilepsy Risk Factors:  Full term, normal birth. Early intervention at about 18 months. Went to [facility]. Given diagnosis of mild/moderate  Walked and talked late. Talked at about 2 yo. 2 years behind in school. Inclusion sometimes. Life skills program in middle and high school. Stopped school at 20. Works 3 days, 4 hours each. 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 Obtained [employer] .      Prior AEDs:  Phenytoin     Prior Evaluation:  Sedated MRI brain done in 5/2019. Study was normal.   All EEGs have been normal.      05/21/2020 EEG awake and asleep (Dr Ashraf): Intermittent brief bursts of diffuse, right hemisphere dominant, 3-4 cps slowing as well as similar slowing isolated to the right hemisphere and maximal over the temporal region suggest underlying neuronal dysfunction with possible focal neuronal dysfunction in the right temporal region. Some of the bursts contain poorly formed, poorly localized, sharp components that are of uncertain significance.      24 hour AEEG 6/30/2020:  Sporadic right temporal delta activities suggests an underlying area of neuronal dysfunction.   The frequent runs of diffuse rhythmic theta activities that were maximal in the right temporal region are of unclear significance. These were typically brief and without clear evolution. Some features (diffuse distribution, abrupt start/stop, and on one occasion, increase in frequency over course of burst) would be suggestive of SREDA, however,  "patients age and short duration of this activity would be atypical for this variant. Overall, these are favored to be a benign variant, but the possibility of brief ictal rhythmic discharges cannot fully be excluded.   A run of bilateral frontal delta activities is favored to represent artifact.      EMU admission 6/2021:   Summary interictal findings:   Generalized sharp/spike and slow wave discharges as well as brief potentially ictal rhythmic discharges (BIRDs) occurring during drowsiness and sleep suggest underlying epileptogenic potential. Intermittent diffuse and mild independent right more than left polymorphic theta/delta activities may represent fragments of generalized discharges/slowing.   Summary event findings:   There were 4 brief electroclinical seizures (2 clusters of 2 seizures each) with diffuse bi hemispheric ictal rhyhtmic theta/alpha activity. The events were identified by the patient's mother as mild typical events. The seizures involve relative behavior arrest, mild facial expression change, restless movements, extending legs, touching face/head with right arm and possible expressive aphasia. The patient repeated \"I'm fine\" in response to prompts from his mother as the events ended.   Seizure Classification:   Focal impaired aware (captured during this admission)  Generalized tonic clonic seizure (not captured during this admission, currently controlled)  Epilepsy Classification: Focal epilepsy with focal impaired aware seizures and bilateral tonic clonic seizures out of sleep versus generalized epilepsy.   EMU findings and discussion:  Patient was admitted to the EMU for spell characterization. Since his events were occurring about 3 times per week at home, no medication weaning or sleep deprivation was required. He had a total of 4 brief typical seizures which occurred on day 1 (2 seizures) and day 2 (2 seizure) of admission. Since his seizures were not controlled on his home AED regimen of " lacosamide 300 mg BID and levetiracetam 1500 mg in the morning and 2000 mg at night, an additional AED was added. AEDs were reviewed with patient and mother and ultimately divalproex was added at 1000 mg twice per day. Plan to continue this new medication at home and consider decreasing or stopping lacosamide at follow up if warranted. Recommend valproic acid level, CBC, and CMP in one week after discharge.         History Reviewed:   The following were reviewed and updated as appropriate: allergies, current medications, past medical history, past social history, and problem list     Psychiatric History:  None     Social History:   Driving: No  Lives Alone:no  Occupation: Works 4 days, 4 hours each      Objective    There were no vitals taken for this visit.     General Exam  No acute distress.    Neurologic Exam  Mental Status:  Alert and oriented.  Language: normal fluency.  Cranial Nerves:  Face symmetric. No dysarthria.      Administrative Statements   Encounter provider Swapnil Ashraf MD    The Patient is located at Home and in the following state in which I hold an active license [place].    The patient was identified by name and date of birth. Gt Pang was informed that this is a telemedicine visit and that the visit is being conducted through the Epic Embedded platform. He agrees to proceed..  My office door was closed. No one else was in the room.  He acknowledged consent and understanding of privacy and security of the video platform. The patient has agreed to participate and understands they can discontinue the visit at any time.    I have spent a total time of 25 minutes in caring for this patient on the day of the visit/encounter including Diagnostic results, Prognosis, Instructions for management, Patient and family education, Risk factor reductions, Impressions, Counseling / Coordination of care, Documenting in the medical record, Reviewing/placing orders in the medical record (including tests,  medications, and/or procedures), and Obtaining or reviewing history  , not including the time spent for establishing the audio/video connection.

## 2025-01-30 NOTE — ASSESSMENT & PLAN NOTE
Gt Pang is a 34 y.o. male with mild developmental delay / static encephalopathy presenting regarding epilepsy manifest as focal aware/impaired aware seizures (baseline was 1-4 per month) and secondary generalized tonic clonic seizures (controlled). Generalized epilepsy is also possible. His neurological exam is normal.     Seizures improved some after addition of divalproex in the EMU in 2021. Benefit of levetiracetam is unclear and could impact mood. We have been gradually decrease levetiracetam. FIAS had remained uncontrolled. Lamotrigine added 2/2024. Will continue to decrease levetiracetam. Will likely wean off at next visit.      Orders:    levETIRAcetam (KEPPRA) 500 mg tablet; Take 1 tablet (500 mg total) by mouth every 12 (twelve) hours    Lacosamide; Future    Lamotrigine level; Future    Valproic acid level, total; Future    Levetiracetam level; Future    Comprehensive metabolic panel; Future    CBC; Future

## 2025-01-30 NOTE — PATIENT INSTRUCTIONS
Decrease levetiracetam to 500 mg twice daily.   Continue other seizure medications unchagned.   Have blood work done in the next couple months. Go to the lab before morning dose or in the late afternoon (trough levels).   Return in about 6 months.

## 2025-01-30 NOTE — PROGRESS NOTES
Review of Systems   Constitutional:  Negative for appetite change, fatigue and fever.   HENT: Negative.  Negative for hearing loss, tinnitus, trouble swallowing and voice change.    Eyes: Negative.  Negative for photophobia, pain and visual disturbance.   Respiratory: Negative.  Negative for shortness of breath.    Cardiovascular: Negative.  Negative for palpitations.   Gastrointestinal: Negative.  Negative for nausea and vomiting.   Endocrine: Negative.  Negative for cold intolerance.   Genitourinary: Negative.  Negative for dysuria, frequency and urgency.   Musculoskeletal:  Negative for back pain, gait problem, myalgias, neck pain and neck stiffness.   Skin: Negative.  Negative for rash.   Allergic/Immunologic: Negative.    Neurological:  Positive for seizures (a couple focal seizures). Negative for dizziness, tremors, syncope, facial asymmetry, speech difficulty, weakness, light-headedness, numbness and headaches.   Hematological: Negative.  Does not bruise/bleed easily.   Psychiatric/Behavioral: Negative.  Negative for confusion, hallucinations and sleep disturbance.    All other systems reviewed and are negative.

## 2025-02-11 DIAGNOSIS — G40.019 PARTIAL IDIOPATHIC EPILEPSY WITH SEIZURES OF LOCALIZED ONSET, INTRACTABLE, WITHOUT STATUS EPILEPTICUS (HCC): ICD-10-CM

## 2025-02-11 RX ORDER — LAMOTRIGINE 100 MG/1
100 TABLET, EXTENDED RELEASE ORAL DAILY
Qty: 30 TABLET | Refills: 0 | Status: SHIPPED | OUTPATIENT
Start: 2025-02-11

## 2025-02-14 NOTE — TELEPHONE ENCOUNTER
Patient in sree of Lab orders faxed to [facility] labs     Fax # [phone]    Please assist   Thank you!

## 2025-02-17 NOTE — PATIENT COMMUNICATION
Clerical Team - please fax all lab orders dated 1/30/25 to:      [facility]  [phone] (f)      Please scan fax confirmation sheet into pt's chart.     Thank you!

## 2025-03-18 DIAGNOSIS — G40.919 INTRACTABLE EPILEPSY (HCC): ICD-10-CM

## 2025-03-19 NOTE — TELEPHONE ENCOUNTER
Medication: Vimpat   PDMP  02/12/2025 10/14/2024 Lacosamide (Tablet) 120.0 30 150 MG NA HANK LAGOS [employer] 3 / 1 [place]   1 [id number] 01/10/2025 10/14/2024 Lacosamide (Tablet) 120.0 30 150 MG NA HNAK LAGOS [employer] 2 / 1 [place]   1 [id number] 12/09/2024 10/14/2024 Lacosamide (Tablet) 120.0 30 150 MG NA HANK LAGOS [employer] 1 / 1 [place]   1 [id number] 11/07/2024 10/14/2024 Lacosamide (Tablet) 120.0 30 150 MG NA HANK LAGOS [employer] 0 / 1 [place]  Active agreement on file -No

## 2025-03-20 RX ORDER — DIVALPROEX SODIUM 250 MG/1
TABLET, FILM COATED, EXTENDED RELEASE ORAL
Qty: 630 TABLET | Refills: 3 | Status: SHIPPED | OUTPATIENT
Start: 2025-03-20

## 2025-03-20 RX ORDER — LACOSAMIDE 150 MG/1
TABLET ORAL
Qty: 360 TABLET | Refills: 1 | Status: SHIPPED | OUTPATIENT
Start: 2025-03-20

## 2025-04-18 DIAGNOSIS — G40.019 PARTIAL IDIOPATHIC EPILEPSY WITH SEIZURES OF LOCALIZED ONSET, INTRACTABLE, WITHOUT STATUS EPILEPTICUS (HCC): ICD-10-CM

## 2025-04-18 RX ORDER — LAMOTRIGINE 100 MG/1
100 TABLET, EXTENDED RELEASE ORAL DAILY
Qty: 30 TABLET | Refills: 3 | Status: SHIPPED | OUTPATIENT
Start: 2025-04-18

## 2025-05-16 ENCOUNTER — NURSE TRIAGE (OUTPATIENT)
Age: 35
End: 2025-05-16

## 2025-05-16 NOTE — TELEPHONE ENCOUNTER
Both the lacosamide and divalproex could be moved from the afternoon to the evening. It is best to take the twice daily medications about 12 hours apart. This may help with afternoon sedation.

## 2025-05-16 NOTE — TELEPHONE ENCOUNTER
"FOLLOW UP: Dr. Asrhaf please advise, thank you!    REASON FOR CONVERSATION: Medication timing    SYMPTOMS: Patient gets tired every day between 5-6 pm. Patient has had 2 episodes of unexpected diarrhea, once in September and last time on past Wednesday.     OTHER: Patient mother wishes to be called with advisement. Patient mother is not on consent and she was made aware verbal consent will be needed to provide advisement when we call back. [facility] communication sheet sent. Patient mother states the Patient will not be home to give verbal consent until after 3:30 pm today.    DISPOSITION: Discuss with Provider and Call Back Patient (overriding Callback by PCP Today)    Reason for Disposition   Caller has NON-URGENT medicine question about med that PCP or specialist prescribed and triager unable to answer question    Answer Assessment - Initial Assessment Questions  1. NAME of MEDICINE: \"What medicine(s) are you calling about?\"      Depakote, Keppra, Vimpat, Lamotrigine.    2. QUESTION: \"What is your question?\" (e.g., double dose of medicine, side effect)      Patient mother has two questions for the provider. 1. Could the timing of his medications be causing him to be tired in the afternoon. 2. Could unexpected diarrhea be linked to a focal seizure.     3. PRESCRIBER: \"Who prescribed the medicine?\" Reason: if prescribed by specialist, call should be referred to that group.      Dr. Ashraf    4. SYMPTOMS: \"Do you have any symptoms?\" If Yes, ask: \"What symptoms are you having?\"  \"How bad are the symptoms (e.g., mild, moderate, severe)    Patient takes the bulk of his medication between 7 am and 8 am:    Three 250 mg tablets depakote   One 500 mg tablet of keppra   Two 150 mg tablets Vimpat    The Patient also takes these medications at 3 pm in the afternoon:  Four Depakote 250 mg tablets  Two 150 mg Vimpat tablets   One 4 mg Pitavastatin tablet    Between 7:45 and 8 pm at night he takes:  One 500 mg Keppra " tablet  One 100 mg of lamotrigine tablet    He gets tired daily between 5-6 pm even on weekends. Patient works from 3:30 pm to 7:30 pm and his supervisor has noticed he gets tired at this time too.       In regards to unexpected diarrhea he has had 2 episodes. Once in early September and once Wednesday night at work. Patient told his mother it was diarrhea he cannot control and cannot get to the bathroom in time. Patient mom wonders if he has a focal seizure that is causing him to have the diarrhea or not get to the bathroom. Patient mom said he is not self aware enough to be able to tell if his stomach felt upset prior, or if it was something he ate.     Patient mother clarified there are no other symptoms she noticed.    Protocols used: Medication Question Call-Adult-[place]

## 2025-05-21 NOTE — TELEPHONE ENCOUNTER
Called re: below and spoke with pt's mother, Enzo, who verbalized an understanding. They will go back to every 12 hour dosing and see what happens.

## 2025-07-15 DIAGNOSIS — G40.019 PARTIAL IDIOPATHIC EPILEPSY WITH SEIZURES OF LOCALIZED ONSET, INTRACTABLE, WITHOUT STATUS EPILEPTICUS (HCC): ICD-10-CM

## 2025-07-16 RX ORDER — LAMOTRIGINE 100 MG/1
1 TABLET, EXTENDED RELEASE ORAL DAILY
Qty: 30 TABLET | Refills: 0 | Status: SHIPPED | OUTPATIENT
Start: 2025-07-16

## 2025-07-23 ENCOUNTER — TELEPHONE (OUTPATIENT)
Age: 35
End: 2025-07-23

## 2025-07-23 NOTE — TELEPHONE ENCOUNTER
pt's mom lindsey tobar called asking if you would like any lab work done prior to his upcoming appt on 8/6.    Pt completed labs that were ordered at last office visit.      No seizures since he was last seen     Please advise  [phone] to leave message

## 2025-08-06 ENCOUNTER — TELEMEDICINE (OUTPATIENT)
Dept: NEUROLOGY | Facility: CLINIC | Age: 35
End: 2025-08-06
Payer: MEDICARE

## 2025-08-06 DIAGNOSIS — F70 MILD INTELLECTUAL DISABILITIES: ICD-10-CM

## 2025-08-06 DIAGNOSIS — G40.019 PARTIAL IDIOPATHIC EPILEPSY WITH SEIZURES OF LOCALIZED ONSET, INTRACTABLE, WITHOUT STATUS EPILEPTICUS (HCC): Primary | ICD-10-CM

## 2025-08-06 DIAGNOSIS — G40.919 INTRACTABLE EPILEPSY (HCC): ICD-10-CM
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
## 2025-08-06 PROCEDURE — 99214 OFFICE O/P EST MOD 30 MIN: CPT | Performed by: PSYCHIATRY & NEUROLOGY

## 2025-08-06 PROCEDURE — G2211 COMPLEX E/M VISIT ADD ON: HCPCS | Performed by: PSYCHIATRY & NEUROLOGY

## 2025-08-06 RX ORDER — LACOSAMIDE 150 MG/1
TABLET ORAL
Qty: 360 TABLET | Refills: 1 | Status: SHIPPED | OUTPATIENT
Start: 2025-08-06